# Patient Record
Sex: FEMALE | Race: WHITE | NOT HISPANIC OR LATINO | Employment: OTHER | URBAN - METROPOLITAN AREA
[De-identification: names, ages, dates, MRNs, and addresses within clinical notes are randomized per-mention and may not be internally consistent; named-entity substitution may affect disease eponyms.]

---

## 2017-05-02 ENCOUNTER — OFFICE VISIT (OUTPATIENT)
Dept: OPTOMETRY | Facility: CLINIC | Age: 67
End: 2017-05-02
Payer: COMMERCIAL

## 2017-05-02 DIAGNOSIS — H26.8 PSEUDOEXFOLIATION OF LENS CAPSULE, LEFT EYE: ICD-10-CM

## 2017-05-02 DIAGNOSIS — H26.9 INCIPIENT CATARACT OF BOTH EYES: ICD-10-CM

## 2017-05-02 DIAGNOSIS — H52.4 PRESBYOPIA: Primary | ICD-10-CM

## 2017-05-02 DIAGNOSIS — H52.03 HYPEROPIA, BILATERAL: ICD-10-CM

## 2017-05-02 DIAGNOSIS — H40.002 GLAUCOMA SUSPECT OF LEFT EYE: ICD-10-CM

## 2017-05-02 PROCEDURE — 92004 COMPRE OPH EXAM NEW PT 1/>: CPT | Performed by: OPTOMETRIST

## 2017-05-02 PROCEDURE — 92015 DETERMINE REFRACTIVE STATE: CPT | Performed by: OPTOMETRIST

## 2017-05-02 ASSESSMENT — REFRACTION_MANIFEST
OD_SPHERE: +0.25
OS_AXIS: 085
OS_CYLINDER: +0.50
OD_CYLINDER: SPHERE
OS_SPHERE: +0.25

## 2017-05-02 ASSESSMENT — TONOMETRY
OD_IOP_MMHG: 16
IOP_METHOD: APPLANATION
OS_IOP_MMHG: 16

## 2017-05-02 ASSESSMENT — SLIT LAMP EXAM - LIDS
COMMENTS: MGD
COMMENTS: MGD

## 2017-05-02 ASSESSMENT — CONF VISUAL FIELD
OS_NORMAL: 1
OD_NORMAL: 1

## 2017-05-02 ASSESSMENT — CUP TO DISC RATIO
OD_RATIO: 0.3
OS_RATIO: 0.5

## 2017-05-02 ASSESSMENT — REFRACTION_WEARINGRX
SPECS_TYPE: OTC READERS
OS_SPHERE: +1.50
OD_SPHERE: +1.50

## 2017-05-02 ASSESSMENT — EXTERNAL EXAM - LEFT EYE: OS_EXAM: NORMAL

## 2017-05-02 ASSESSMENT — VISUAL ACUITY
METHOD: SNELLEN - LINEAR
OS_SC+: -2
OS_SC: 20/25
OD_SC+: -2
OD_SC: 20/20

## 2017-05-02 ASSESSMENT — EXTERNAL EXAM - RIGHT EYE: OD_EXAM: NORMAL

## 2017-05-02 NOTE — MR AVS SNAPSHOT
After Visit Summary   5/2/2017    Meggan Jones    MRN: [id number]           Patient Information     Date Of Birth          1950        Visit Information        Provider Department      5/2/2017 2:30 PM Selma Hudson, [facility]        Today's Diagnoses     Presbyopia    -  1    Hyperopia, bilateral        Incipient cataract of both eyes        Pseudoexfoliation of lens capsule, left eye        Glaucoma suspect of left eye          Care Instructions    Use +2.75 readers  +2.00 for arms length / computer      DRY EYE TREATMENT    I recommend using artificial tears for your dry eye. There are over the counter drops that work well and may be used up to 4x daily. ( systane balance, refresh optive, soothe xp) If you need more than 4 drops daily, use a preservative free product which come in individual vials which may be used for 24 hours and discarded.   Artificial tears work best as a preventative and not as well after your eyes are starting to bother you.  It may take 4- 6 weeks of using the drops before you notice improvement.  If after that time you are still having problems schedule an appointment for an evaluation and discussion of different treatments.  Dry eyes are a chronic condition and you may have more symptoms at certain times of the year.      In addition:  Warm compresses once to twice daily for 5-10 minutes    Omega 3 fatty acid supplements 1-2x daily      Call [facility] in [place] for another OCT and visual field test [phone]        Follow-ups after your visit        Additional Services     OPHTHALMOLOGY ADULT REFERRAL       Your provider has referred you to:  N: [facility], [facility]  [phone]  [email or web address]  For a subsequent OCT and visual field    Please be aware that coverage of these services is subject to the terms and limitations of your health insurance plan.  Call member services at your health  plan with any benefit or coverage questions.      Please bring the following to your appointment:  >>   Any x-rays, CTs or MRIs which have been performed.  Contact the facility where they were done to arrange for  prior to your scheduled appointment.  Any new CT, MRI or other procedures ordered by your specialist must be performed at a [facility] facility or coordinated by your clinic's referral office.    >>   List of current medications   >>   This referral request   >>   Any documents/labs given to you for this referral                  Follow-up notes from your care team     Return in about 1 year (around 5/2/2018).      Who to contact     If you have questions or need follow up information about today's clinic visit or your schedule please contact [facility] directly at [phone].  Normal or non-critical lab and imaging results will be communicated to you by MyChart, letter or phone within 4 business days after the clinic has received the results. If you do not hear from us within 7 days, please contact the clinic through [email or web address] or phone. If you have a critical or abnormal lab result, we will notify you by phone as soon as possible.  Submit refill requests through [employer] or call your pharmacy and they will forward the refill request to us. Please allow 3 business days for your refill to be completed.          Additional Information About Your Visit        [employer] Information     [employer] gives you secure access to your electronic health record. If you see a primary care provider, you can also send messages to your care team and make appointments. If you have questions, please call your primary care clinic.  If you do not have a primary care provider, please call [phone] and they will assist you.        Care EveryWhere ID     This is your Care EveryWhere ID. This could be used by other organizations to access your [facility] medical records  [id number]        Your Vitals Were     Last  Period                   01/26/2010            Blood Pressure from Last 3 Encounters:   11/07/16 144/60   10/12/16 140/72   09/23/15 130/62    Weight from Last 3 Encounters:   10/12/16 97.7 kg (215 lb 5 oz)   09/23/15 95.9 kg (211 lb 7 oz)   08/18/15 94.7 kg (208 lb 12.8 oz)              We Performed the Following     EYE EXAM (SIMPLE-NONBILLABLE)     OPHTHALMOLOGY ADULT REFERRAL     REFRACTION        Primary Care Provider Office Phone # Fax #    Adrianna Aparicio [phone]       [facility] [address] [place] [address]        Thank you!     Thank you for choosing [facility]  for your care. Our goal is always to provide you with excellent care. Hearing back from our patients is one way we can continue to improve our services. Please take a few minutes to complete the written survey that you may receive in the mail after your visit with us. Thank you!             Your Updated Medication List - Protect others around you: Learn how to safely use, store and throw away your medicines at www.disposemymeds.org.          This list is accurate as of: 5/2/17  3:17 PM.  Always use your most recent med list.                   Brand Name Dispense Instructions for use    aspirin 81 MG tablet      1 tab po QD (Once per day)       atorvastatin 40 MG tablet    LIPITOR    90 tablet    Take 1 tablet (40 mg) by mouth daily       calcium + D 600-200 MG-UNIT Tabs   Generic drug:  calcium carbonate-vitamin D      1 tab per day       CENTRUM SILVER per tablet      1 TABLET DAILY       FISH OIL CONCENTRATE 1000 MG Caps      1 capsule daily       GLUCOSAMINE CHONDR 500 COMPLEX PO      Take 500 mg by mouth daily       lisinopril-hydrochlorothiazide 20-12.5 MG per tablet    PRINZIDE/ZESTORETIC    60 tablet    Take 2 tablets by mouth daily       vitamin E 400 UNIT capsule      Take 1 capsule by mouth daily.

## 2017-05-02 NOTE — PROGRESS NOTES
Chief Complaint   Patient presents with     COMPREHENSIVE EYE EXAM      Routine, Lyme disease in both eyes 1992,   TBI 2013, glaucoma suspect did not     Last Eye Exam: 1yr [place]   Dilated Previously: Yes    What are you currently using to see?  does not use glasses or contacts for distance only readers        Distance Vision Acuity: Satisfied with vision    Near Vision Acuity: Not satisfied     Eye Comfort: good  Do you use eye drops? : Yes: Systane  Occupation or HobbiesRetired teacher        Medical, surgical and family histories reviewed and updated 5/2/2017.       OBJECTIVE: See Ophthalmology exam    ASSESSMENT:    ICD-10-CM    1. Presbyopia H52.4 REFRACTION     EYE EXAM (SIMPLE-NONBILLABLE)   2. Hyperopia, bilateral H52.03 REFRACTION   3. Incipient cataract of both eyes H26.9    4. Pseudoexfoliation of lens capsule, left eye H26.8         PLAN:   Warm compresses / artificial tears    Continue with readers  Discussed risk for glaucoma   Repeat OCT/ VF at [facility] in  as this was last done in 2013.         Selma Hudson OD

## 2017-05-02 NOTE — PATIENT INSTRUCTIONS
Use +2.75 readers  +2.00 for arms length / computer      DRY EYE TREATMENT    I recommend using artificial tears for your dry eye. There are over the counter drops that work well and may be used up to 4x daily. ( systane balance, refresh optive, soothe xp) If you need more than 4 drops daily, use a preservative free product which come in individual vials which may be used for 24 hours and discarded.   Artificial tears work best as a preventative and not as well after your eyes are starting to bother you.  It may take 4- 6 weeks of using the drops before you notice improvement.  If after that time you are still having problems schedule an appointment for an evaluation and discussion of different treatments.  Dry eyes are a chronic condition and you may have more symptoms at certain times of the year.      In addition:  Warm compresses once to twice daily for 5-10 minutes    Omega 3 fatty acid supplements 1-2x daily      Call [facility] in [place] for another OCT and visual field test [phone]

## 2017-05-10 ENCOUNTER — OFFICE VISIT (OUTPATIENT)
Dept: PODIATRY | Facility: CLINIC | Age: 67
End: 2017-05-10
Payer: COMMERCIAL

## 2017-05-10 ENCOUNTER — RADIANT APPOINTMENT (OUTPATIENT)
Dept: GENERAL RADIOLOGY | Facility: CLINIC | Age: 67
End: 2017-05-10
Attending: PODIATRIST
Payer: COMMERCIAL

## 2017-05-10 VITALS — HEART RATE: 80 BPM | HEIGHT: 62 IN | BODY MASS INDEX: 39.56 KG/M2 | WEIGHT: 215 LBS

## 2017-05-10 DIAGNOSIS — M67.40 GANGLION CYST: ICD-10-CM

## 2017-05-10 DIAGNOSIS — M19.079 ARTHRITIS, MIDFOOT: ICD-10-CM

## 2017-05-10 DIAGNOSIS — L84 CALLUS OF FOOT: ICD-10-CM

## 2017-05-10 DIAGNOSIS — M79.672 LEFT FOOT PAIN: Primary | ICD-10-CM

## 2017-05-10 DIAGNOSIS — M79.672 LEFT FOOT PAIN: ICD-10-CM

## 2017-05-10 PROCEDURE — 99203 OFFICE O/P NEW LOW 30 MIN: CPT | Performed by: PODIATRIST

## 2017-05-10 PROCEDURE — 73630 X-RAY EXAM OF FOOT: CPT | Mod: LT

## 2017-05-10 NOTE — PROGRESS NOTES
ASSESSMENT/PLAN:    Encounter Diagnoses   Name Primary?     Left foot pain Yes     Arthritis, midfoot      Ganglion cyst, dorsal left foot      We reviewed the x-rays together. I showed her the bony spurring on the dorsal foot. This is likely part of the palpable bump, but also might be causing tendon sheath injury and leaking of synovial fluid.     Her pain seems to be more bump related, rather than deep.      We discussed the cause and nature of ganglionic cysts.  I explained that they are benign, but can cause discomfort due to foot wear irritation.  We discussed the options of aspiration and surgical excision.  It was made clear that despite treatment, they have a high rate of recurrance.  I explained that the location is near the artery and nerve.     She is to focus on accommodative shoes and altering her lacing pattern. She will consider aspiration.    Regarding the right foot callus:  Filing, pairing, moisturizing, shoe insert with aperture nor notched all reviewed with her.     Body mass index is 39.64 kg/(m^2).    Weight management plan: Patient was referred to their PCP to discuss a diet and exercise plan.      Quentin Berger DPM, FACFAS, MS    [place] [facility] of Podiatry/Foot & Ankle Surgery      ____________________________________________________________________    HPI:       Chief Complaint: lump on upper left foot  Onset of problem: weeks  Pain/ discomfort is described as:  Occasional pain when walking  Rating:  3/10   The pain is made worse with tight fitting shoes  She questions if it is related to some supplements that she is taking    Secondary concerns:  Painful callus, right foot. Her  helps shave it down. She also uses a pumice stone. She asks if there are other options.    *  Past Medical History:   Diagnosis Date     Allergic rhinitis, cause unspecified      ASCUS favor benign 2004, 2014    neg HPV Plan cotest in 3 yrs     Essential hypertension, benign      Lyme disease     *  *  Past Surgical History:   Procedure Laterality Date     BIOPSY OF BREAST, NEEDLE CORE  2000    Negative-left side 2000     C LIGATE FALLOPIAN TUBE,POSTPARTUM  1981    Tubal Ligation     C NONSPECIFIC PROCEDURE      conization of cervix -long ago? time   *  *  Current Outpatient Prescriptions   Medication Sig Dispense Refill     lisinopril-hydrochlorothiazide (PRINZIDE,ZESTORETIC) 20-12.5 MG per tablet Take 2 tablets by mouth daily 60 tablet 1     Glucosamine-Chondroit-Vit C-Mn (GLUCOSAMINE CHONDR 500 COMPLEX PO) Take 500 mg by mouth daily       vitamin E 400 UNIT capsule Take 1 capsule by mouth daily.       FISH OIL CONCENTRATE OR CAPS 1 capsule daily       ASPIRIN 81 MG OR TABS 1 tab po QD (Once per day)       CALCIUM + D 600-200 MG-UNIT PO TABS Reported on 5/10/2017  3     CENTRUM SILVER OR TABS 1 TABLET DAILY (Patient not taking: Reported on 5/10/2017)         ROS:     A 10-point review of systems was performed and is positive for that noted in the HPI and as seen below.  All other areas are negative.     Numbness in feet?  no   Calf pain with walking? no  Recent foot/ankle injury? no  Weight change over past 12 months? 10# gain  Self perception as overweight? yees  Recent flu-like symptoms? no  Joint pain other than feet ? Shoulders, left thumb    Social History: Employment:  no;  Exercise/Physical activity:  walking;  Tobacco use:  no  Social History     Social History     Marital status:      Spouse name: N/A     Number of children: N/A     Years of education: N/A     Occupational History     Not on file.     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 No     Social History Narrative     since  , going well, went back to work in 2013   , 3 children-3 daughters-40 , 37 and 33 yo, 5 granchildren,  no pets       Family history:  Family History   Problem Relation Age of Onset     C.A.D. Mother      heart valve replacement- from heart failure at of death at 85     CANCER Father      Prostate, from cardiac arrest at age of 81     CANCER Brother      Prostate,heart valve replacemend at ageo f 73-alive      C.A.D. Brother      MI at 71-3 brothers     Family History Negative Brother      one  brother  at age of 69     Family History Negative Sister      6 sisters-one sister with rheumatic fever and related haert murmur, 2 sis iwth htn     Breast Cancer Sister      Breast Cancer Other      niece     Other Cancer Sister      Melanoma     DIABETES No family hx of      Cancer - colorectal No family hx of        Rheumatoid arthritis:  no  Foot Problems: no  Diabetes: no      EXAM:    Vitals: Pulse 80  Ht 5' 1.75\" (1.568 m)  Wt 215 lb (97.5 kg)  LMP 2010  BMI 39.64 kg/m2  BMI: Body mass index is 39.64 kg/(m^2).  Height: 5' 1.75\"    Constitutional/ general:  Pt is in no apparent distress, appears well-nourished.  Cooperative with history and physical exam.     Vascular:  Pedal pulses are palpable bilaterally for both the DP and PT arteries.  CFT < 3 sec.  No edema.  Pedal hair growth noted.     Neuro:  Alert and oriented x 3. Coordinated gait.  Light touch sensation is intact to the L4, L5, S1 distributions. No obvious deficits.  No evidence of neurological-based weakness, spasticity, or contracture in the lower extremities.     Derm: " Normal texture and turgor.  No erythema, ecchymosis, or cyanosis.  No open lesions.     There is an area of soft tissue edema, dorsal left midfoot; 2cm diameter.  Soft, compressible, mobile.  On palpation there is a deeper, harder bump.     Callus sub right 5th met head.     Musculoskeletal:    Lower extremity muscle strength is normal.  Patient is ambulatory without an assistive device or brace .  No gross deformities.      Radiographic Exam:  X-Ray Findings:  I personally reviewed the films.  4 views left foot shoe extensive degenerative joint disease in the tarsometatarsal joints with dorsal spurring. Dorsal soft tissue edema.       Quentin Berger DPM, FACFAS, MS    [place] [facility] of Podiatry/Foot & Ankle Surgery

## 2017-05-10 NOTE — PATIENT INSTRUCTIONS
DR. PRINGLE'S SCHEDULE:        MONDAY / FRIDAY AM - [place] WEDNESDAY - [place]   [address] [place] [address] [place] [address]   P: [phone] P: [phone]   F: [phone] F:[phone]       TUESDAY - SURGERY THURSDAY - [place]   [phone] [address] [place] [address]   TO SCHEDULE AN APPT CALL: P: [phone] F: [phone]     FYI: Our schedule at [facility] on Wednesday is from 7 AM - 2 PM.    GANGLION CYST  A ganglion cyst is a sac filled with a jellylike fluid that originates from a tendon sheath or joint capsule. The word  ganglion  means  knot  and is used to describe the knot-like mass or lump that forms below the surface of the skin.  Ganglion cysts are among the most common benign soft-tissue masses. Although they most often occur on the wrist, they also frequently develop on the foot   usually on the top, but elsewhere as well. Ganglion cysts vary in size, may get smaller and larger, and may even disappear completely, only to return later.  CAUSES  Although the exact cause of ganglion cysts is unknown, they may arise from trauma   whether a single event or repetitive micro-trauma.  SYMPTOMS  A noticeable lump   often this is the only symptom experienced   Tingling or burning, if the cyst is touching a nerve   Dull pain or ache   which may indicate the cyst is pressing against a tendon or joint   Difficulty wearing shoes due to irritation between the lump and the shoe   DIAGNOSIS  To diagnose a ganglion cyst, the foot and ankle surgeon will perform a thorough examination of the foot. The lump will be visually apparent and, when pressed in a certain way, it should move freely underneath the skin. Sometimes the surgeon will shine a light through the cyst or remove a small amount of fluid from the cyst for evaluation. Your doctor may take an x-ray, and in some cases additional imaging studies may be ordered.  NON-SURGICAL  TREATMENT  Monitoring, but no treatment. If the cyst causes no pain and does not interfere with walking, the surgeon may decide it is best to carefully watch the cyst over a period of time.   Shoe modifications. Wearing shoes that do not rub the cyst or cause irritation may be advised. In addition, placing a pad inside the shoe may help reduce pressure against the cyst.   Aspiration and injection. This technique involves draining the fluid and then injecting a steroid medication into the mass. More than one session may be needed. Although this approach is successful in some cases, in many others the cyst returns.   SURGICAL TREATMENT  When other treatment options fail or are not appropriate, the cyst may need to be surgically removed. While the recurrence rate associated with surgery is much lower than that experienced with aspiration and injection therapy, there are nevertheless cases in which the ganglion cyst returns.        BODY MASS INDEX (BMI)  Many things can cause foot and ankle problems. Foot structure, activity level, foot mechanics and injuries are common causes of pain.    One very important issue that often goes unmentioned, is body weight.  Extra weight can cause increased stress on muscles, ligaments, bones and tendons. Sometimes just a few extra pounds is all it takes to put one over her/his threshold. Without reducing that stress, it can be difficult to alleviate pain.      Some people are uncomfortable addressing this issue, but we feel it is important for you to think about it. As Foot & Ankle specialists, our job is addressing the lower extremity problem and possible causes.     Regarding extra body weight, we encourage patients to discuss diet and weight management plans with their primary care doctors. It is this team approach that gives you the best opportunity for pain relief and getting you back on your feet.      PRICE THERAPY    Many aches and pains throughout the foot and ankle can be  helped with many simple treatments. This is usually described as PRICE Therapy.      P - Protection - often times, inflammation/pain in the lower extremity is not able to improve simply because the areas involved are never allowed to rest. Every step we take can bother the problematic area. Protecting those areas is an important step in the healing process. This may involve a walking cast boot, a special insert/orthotic device, an ankle brace, or simply avoiding barefoot walking.    R - Rest - in addition to protecting the foot/ankle, resting is an important, but often times difficult, treatment option. Getting off your feet when they bother you, and specifically avoiding activities that cause pain/discomfort, are very beneficial to prevent, and treat, foot/ankle pain.      I - Ice - icing regularly can help to decrease inflammation and swelling in the foot, thus decreasing pain. Using an ice pack or a bag of frozen veggies works very well. Ice for 20 minutes multiple times per day as needed.  Do not place the ice directly on the skin as this can cause tissue damage.    C - Compression - using a compression wrap or an ACE wrap can help to decrease swelling, which can help to decrease pain. Wearing the wraps is generally not needed at night, but they should be worn on a regular basis when you are going to be on your feet for prolonged periods as gravity tends to pull fluids down to your feet/ankles.    E - Elevation - elevating your lower extremities multiple times daily for 15-20 minutes can help to decrease swelling, which works well in decreasing pain levels.    NSAID/Tylenol - Anti-inflammatories like Aleve or ibuprofen, and/or a pain medication, such as Tylenol, can help to improve pain levels and get the issue resolved sooner rather than later. Anyone with liver issues should be careful with Tylenol, and anyone with high blood pressure or heart, stomach or kidney issues should be careful with  anti-inflammatories. Please ask if you have questions about these medications, including dosage.        CALLUS / CORN / IPK    When there is excessive friction or pressure on the skin, the body responds by making the skin thicker to protect the deeper structures from becoming exposed. While this works well to protect the deeper structures, the thickened skin can cause increased pressure and pain.    Callus: flat, diffuse thickened areas are simple calluses and they are usually caused by friction. Often these are the result of rubbing on a shoe or from going barefoot.    Corns: calluses with a central core on or between the toes are called corns. These result from prominent joints on toes rubbing together. Theses are a symptom of bone malalignment or illfitting shoes and will always recur unless the underlying bones are addressed surgically.    IPK: calluses with a central core on the ball of the foot are usually IPKs (intractable plantar keratosis). These are caused by excessive pressure from the metatarsals, the bones that make up the ball of the foot. Often one of these bones is too long or too prominent. Again, these will always recur unless the underlying bone issue is addressed. There is no cure for these. They will either go away by themselves, recur, or more could develop.    Home Treatment  - File: Trim them down with a pumice stone or callus file a couple times a week to remove callus tissue that builds up. An electric callus removing device. Amope Pedi Perfect Electronic Pedicure Foot File and Callus Remover can be a good option.   - Moisturize: Lotion can be applied to soften the callus. A lactic acid or urea based cream such as Carmol, Kersal or Vanicream or thicker cream with shea butter are good options.   - Foot Gear: Good supportive shoes and minimizing barefoot walking can slow down callus formation and can decrease pain levels. Gel inserts can also provide padding to the bottom of the foot to  prevent pain and slow recurrence. Toe spacers, toe covers, can custom orthotic inserts can be beneficial as well.  - Surgery: If there is a surgical pathology noted, such as a prominent bone, often this needs to be addressed surgically to minimize recurrence. However, sometimes the lesion simply migrates to another spot after surgery, so it is not a guaranteed cure.     If you cannot treat them yourself at home, call the home foot care nurses below:  [facility]  [phone] [facility]   [phone] [facility]   [phone]       OSTEOARTHRITIS OF THE FOOT & ANKLE  Osteoarthritis is a condition characterized by the breakdown and eventual loss of cartilage in one or more joints. Cartilage (the connective tissue found at the end of the bones in the joints) protects and cushions the bones during movement. When cartilage deteriorates or is lost, symptoms develop that can restrict one s ability to easily perform daily activities.  Osteoarthritis is also known as degenerative arthritis, reflecting its nature to develop as part of the aging process. As the most common form of arthritis, osteoarthritis affects millions of Americans. Some people refer to osteoarthritis simply as arthritis, even though there are many different types of arthritis.  Osteoarthritis appears at various joints throughout the body, including the hands, feet, spine, hips, and knees. In the foot, the disease most frequently occurs in the big toe, although it is also often found in the midfoot and ankle.  CAUSES  Osteoarthritis is considered a  wear and tear  disease because the cartilage in the joint wears down with repeated stress and use over time. As the cartilage deteriorates and gets thinner, the bones lose their protective covering and eventually may rub together, causing pain and inflammation of the joint.  An injury may also lead to osteoarthritis, although it may take months or years after the injury for the condition to develop. For  example, osteoarthritis in the big toe is often caused by kicking or jamming the toe, or by dropping something on the toe. Osteoarthritis in the midfoot is often caused by dropping something on it, or by a sprain or fracture. In the ankle, osteoarthritis is usually caused by a fracture and occasionally by a severe sprain.  Sometimes osteoarthritis develops as a result of abnormal foot mechanics such as flat feet or high arches. A flat foot causes less stability in the ligaments (bands of tissue that connect bones), resulting in excessive strain on the joints, which can cause arthritis. A high arch is rigid and lacks mobility, causing a jamming of joints that creates an increased risk of arthritis.  SYMPTOMS  People with osteoarthritis in the foot or ankle experience, in varying degrees, one or more of the following: Pain and stiffness in the joint, swelling in or near the joint, or difficulty walking or bending the joint.   Some patients with osteoarthritis also develop a bone spur (a bony protrusion) at the affected joint. Shoe pressure may cause pain at the site of a bone spur, and in some cases blisters or calluses may form over its surface. Bone spurs can also limit the movement of the joint.    DIAGNOSIS  In diagnosing osteoarthritis, the foot and ankle surgeon will examine the foot thoroughly, looking for swelling in the joint, limited mobility, and pain with movement. In some cases, deformity and/or enlargement (spur) of the joint may be noted. X-rays may be ordered to evaluate the extent of the disease.  NON-SURGICAL TREATMENT  To help relieve symptoms, the surgeon may begin treating osteoarthritis with one or more of the following non-surgical approaches:  Oral medications. Nonsteroidal anti-inflammatory drugs (NSAIDs), such as ibuprofen, are often helpful in reducing the inflammation and pain. Occasionally a prescription for a steroid medication is needed to adequately reduce symptoms.   Orthotic devices.  Custom orthotic devices (shoe inserts) are often prescribed to provide support to improve the foot s mechanics or cushioning to help minimize pain.   Bracing. Bracing, which restricts motion and supports the joint, can reduce pain during walking and help prevent further deformity.   Immobilization. Protecting the foot from movement by wearing a cast or removable cast-boot may be necessary to allow the inflammation to resolve.   Steroid injections. In some cases, steroid injections are applied to the affected joint to deliver anti-inflammatory medication.   Physical therapy. Exercises to strengthen the muscles, especially when the osteoarthritis occurs in the ankle, may give the patient greater stability and help avoid injury that might worsen the condition.   DO I NEED SURGERY?  When osteoarthritis has progressed substantially or failed to improve with non-surgical treatment, surgery may be recommended. In advanced cases, surgery may be the only option. The goal of surgery is to decrease pain and improve function. The foot and ankle surgeon will consider a number of factors when selecting the procedure best suited to the patient s condition and lifestyle.

## 2017-05-10 NOTE — MR AVS SNAPSHOT
After Visit Summary   5/10/2017    Meggan Jones    MRN: [id number]           Patient Information     Date Of Birth          1950        Visit Information        Provider Department      5/10/2017 11:30 AM Quentin Pringle DPM [facility]        Today's Diagnoses     Left foot pain    -  1      Care Instructions    DR. PRINGLE'S SCHEDULE:        MONDAY / FRIDAY AM - [place] WEDNESDAY - [place]   [address] [place] [address] [place] [address]   P: [phone] P: [phone]   F: [phone] F:[phone]       [facility]   [phone] [address] [place] [address]   TO SCHEDULE AN APPT CALL: P: [phone] F: [phone]     FYI: Our schedule at [place] on Wednesday is from 7 AM - 2 PM.    GANGLION CYST  A ganglion cyst is a sac filled with a jellylike fluid that originates from a tendon sheath or joint capsule. The word  ganglion  means  knot  and is used to describe the knot-like mass or lump that forms below the surface of the skin.  Ganglion cysts are among the most common benign soft-tissue masses. Although they most often occur on the wrist, they also frequently develop on the foot - usually on the top, but elsewhere as well. Ganglion cysts vary in size, may get smaller and larger, and may even disappear completely, only to return later.  CAUSES  Although the exact cause of ganglion cysts is unknown, they may arise from trauma - whether a single event or repetitive micro-trauma.  SYMPTOMS  A noticeable lump - often this is the only symptom experienced   Tingling or burning, if the cyst is touching a nerve   Dull pain or ache - which may indicate the cyst is pressing against a tendon or joint   Difficulty wearing shoes due to irritation between the lump and the shoe   DIAGNOSIS  To diagnose a ganglion cyst, the foot and ankle surgeon will perform a thorough examination of the  foot. The lump will be visually apparent and, when pressed in a certain way, it should move freely underneath the skin. Sometimes the surgeon will shine a light through the cyst or remove a small amount of fluid from the cyst for evaluation. Your doctor may take an x-ray, and in some cases additional imaging studies may be ordered.  NON-SURGICAL TREATMENT  Monitoring, but no treatment. If the cyst causes no pain and does not interfere with walking, the surgeon may decide it is best to carefully watch the cyst over a period of time.   Shoe modifications. Wearing shoes that do not rub the cyst or cause irritation may be advised. In addition, placing a pad inside the shoe may help reduce pressure against the cyst.   Aspiration and injection. This technique involves draining the fluid and then injecting a steroid medication into the mass. More than one session may be needed. Although this approach is successful in some cases, in many others the cyst returns.   SURGICAL TREATMENT  When other treatment options fail or are not appropriate, the cyst may need to be surgically removed. While the recurrence rate associated with surgery is much lower than that experienced with aspiration and injection therapy, there are nevertheless cases in which the ganglion cyst returns.        BODY MASS INDEX (BMI)  Many things can cause foot and ankle problems. Foot structure, activity level, foot mechanics and injuries are common causes of pain.    One very important issue that often goes unmentioned, is body weight.  Extra weight can cause increased stress on muscles, ligaments, bones and tendons. Sometimes just a few extra pounds is all it takes to put one over her/his threshold. Without reducing that stress, it can be difficult to alleviate pain.      Some people are uncomfortable addressing this issue, but we feel it is important for you to think about it. As Foot & Ankle specialists, our job is addressing the lower extremity problem  and possible causes.     Regarding extra body weight, we encourage patients to discuss diet and weight management plans with their primary care doctors. It is this team approach that gives you the best opportunity for pain relief and getting you back on your feet.      PRICE THERAPY    Many aches and pains throughout the foot and ankle can be helped with many simple treatments. This is usually described as PRICE Therapy.      P - Protection - often times, inflammation/pain in the lower extremity is not able to improve simply because the areas involved are never allowed to rest. Every step we take can bother the problematic area. Protecting those areas is an important step in the healing process. This may involve a walking cast boot, a special insert/orthotic device, an ankle brace, or simply avoiding barefoot walking.    R - Rest - in addition to protecting the foot/ankle, resting is an important, but often times difficult, treatment option. Getting off your feet when they bother you, and specifically avoiding activities that cause pain/discomfort, are very beneficial to prevent, and treat, foot/ankle pain.      I - Ice - icing regularly can help to decrease inflammation and swelling in the foot, thus decreasing pain. Using an ice pack or a bag of frozen veggies works very well. Ice for 20 minutes multiple times per day as needed.  Do not place the ice directly on the skin as this can cause tissue damage.    C - Compression - using a compression wrap or an ACE wrap can help to decrease swelling, which can help to decrease pain. Wearing the wraps is generally not needed at night, but they should be worn on a regular basis when you are going to be on your feet for prolonged periods as gravity tends to pull fluids down to your feet/ankles.    E - Elevation - elevating your lower extremities multiple times daily for 15-20 minutes can help to decrease swelling, which works well in decreasing pain levels.    NSAID/Tylenol  - Anti-inflammatories like Aleve or ibuprofen, and/or a pain medication, such as Tylenol, can help to improve pain levels and get the issue resolved sooner rather than later. Anyone with liver issues should be careful with Tylenol, and anyone with high blood pressure or heart, stomach or kidney issues should be careful with anti-inflammatories. Please ask if you have questions about these medications, including dosage.        CALLUS / CORN / IPK    When there is excessive friction or pressure on the skin, the body responds by making the skin thicker to protect the deeper structures from becoming exposed. While this works well to protect the deeper structures, the thickened skin can cause increased pressure and pain.    Callus: flat, diffuse thickened areas are simple calluses and they are usually caused by friction. Often these are the result of rubbing on a shoe or from going barefoot.    Corns: calluses with a central core on or between the toes are called corns. These result from prominent joints on toes rubbing together. Theses are a symptom of bone malalignment or illfitting shoes and will always recur unless the underlying bones are addressed surgically.    IPK: calluses with a central core on the ball of the foot are usually IPKs (intractable plantar keratosis). These are caused by excessive pressure from the metatarsals, the bones that make up the ball of the foot. Often one of these bones is too long or too prominent. Again, these will always recur unless the underlying bone issue is addressed. There is no cure for these. They will either go away by themselves, recur, or more could develop.    Home Treatment  - File: Trim them down with a pumice stone or callus file a couple times a week to remove callus tissue that builds up. An electric callus removing device. Amope Pedi Perfect Electronic Pedicure Foot File and Callus Remover can be a good option.   - Moisturize: Lotion can be applied to soften the  callus. A lactic acid or urea based cream such as Carmol, Kersal or Vanicream or thicker cream with shea butter are good options.   - Foot Gear: Good supportive shoes and minimizing barefoot walking can slow down callus formation and can decrease pain levels. Gel inserts can also provide padding to the bottom of the foot to prevent pain and slow recurrence. Toe spacers, toe covers, can custom orthotic inserts can be beneficial as well.  - Surgery: If there is a surgical pathology noted, such as a prominent bone, often this needs to be addressed surgically to minimize recurrence. However, sometimes the lesion simply migrates to another spot after surgery, so it is not a guaranteed cure.     If you cannot treat them yourself at home, call the home foot care nurses below:  [facility]  [phone] [facility]   [phone] [facility]   [phone]       OSTEOARTHRITIS OF THE FOOT & ANKLE  Osteoarthritis is a condition characterized by the breakdown and eventual loss of cartilage in one or more joints. Cartilage (the connective tissue found at the end of the bones in the joints) protects and cushions the bones during movement. When cartilage deteriorates or is lost, symptoms develop that can restrict one s ability to easily perform daily activities.  Osteoarthritis is also known as degenerative arthritis, reflecting its nature to develop as part of the aging process. As the most common form of arthritis, osteoarthritis affects millions of Americans. Some people refer to osteoarthritis simply as arthritis, even though there are many different types of arthritis.  Osteoarthritis appears at various joints throughout the body, including the hands, feet, spine, hips, and knees. In the foot, the disease most frequently occurs in the big toe, although it is also often found in the midfoot and ankle.  CAUSES  Osteoarthritis is considered a  wear and tear  disease because the cartilage in the joint wears down with repeated  stress and use over time. As the cartilage deteriorates and gets thinner, the bones lose their protective covering and eventually may rub together, causing pain and inflammation of the joint.  An injury may also lead to osteoarthritis, although it may take months or years after the injury for the condition to develop. For example, osteoarthritis in the big toe is often caused by kicking or jamming the toe, or by dropping something on the toe. Osteoarthritis in the midfoot is often caused by dropping something on it, or by a sprain or fracture. In the ankle, osteoarthritis is usually caused by a fracture and occasionally by a severe sprain.  Sometimes osteoarthritis develops as a result of abnormal foot mechanics such as flat feet or high arches. A flat foot causes less stability in the ligaments (bands of tissue that connect bones), resulting in excessive strain on the joints, which can cause arthritis. A high arch is rigid and lacks mobility, causing a jamming of joints that creates an increased risk of arthritis.  SYMPTOMS  People with osteoarthritis in the foot or ankle experience, in varying degrees, one or more of the following: Pain and stiffness in the joint, swelling in or near the joint, or difficulty walking or bending the joint.   Some patients with osteoarthritis also develop a bone spur (a bony protrusion) at the affected joint. Shoe pressure may cause pain at the site of a bone spur, and in some cases blisters or calluses may form over its surface. Bone spurs can also limit the movement of the joint.    DIAGNOSIS  In diagnosing osteoarthritis, the foot and ankle surgeon will examine the foot thoroughly, looking for swelling in the joint, limited mobility, and pain with movement. In some cases, deformity and/or enlargement (spur) of the joint may be noted. X-rays may be ordered to evaluate the extent of the disease.  NON-SURGICAL TREATMENT  To help relieve symptoms, the surgeon may begin treating  osteoarthritis with one or more of the following non-surgical approaches:  Oral medications. Nonsteroidal anti-inflammatory drugs (NSAIDs), such as ibuprofen, are often helpful in reducing the inflammation and pain. Occasionally a prescription for a steroid medication is needed to adequately reduce symptoms.   Orthotic devices. Custom orthotic devices (shoe inserts) are often prescribed to provide support to improve the foot s mechanics or cushioning to help minimize pain.   Bracing. Bracing, which restricts motion and supports the joint, can reduce pain during walking and help prevent further deformity.   Immobilization. Protecting the foot from movement by wearing a cast or removable cast-boot may be necessary to allow the inflammation to resolve.   Steroid injections. In some cases, steroid injections are applied to the affected joint to deliver anti-inflammatory medication.   Physical therapy. Exercises to strengthen the muscles, especially when the osteoarthritis occurs in the ankle, may give the patient greater stability and help avoid injury that might worsen the condition.   DO I NEED SURGERY?  When osteoarthritis has progressed substantially or failed to improve with non-surgical treatment, surgery may be recommended. In advanced cases, surgery may be the only option. The goal of surgery is to decrease pain and improve function. The foot and ankle surgeon will consider a number of factors when selecting the procedure best suited to the patient s condition and lifestyle.                  Follow-ups after your visit        Who to contact     If you have questions or need follow up information about today's clinic visit or your schedule please contact [facility] [place] directly at [phone].  Normal or non-critical lab and imaging results will be communicated to you by MyChart, letter or phone within 4 business days after the clinic has received the results. If you do not hear from us within 7 days,  "please contact the clinic through [employer] or phone. If you have a critical or abnormal lab result, we will notify you by phone as soon as possible.  Submit refill requests through [employer] or call your pharmacy and they will forward the refill request to us. Please allow 3 business days for your refill to be completed.          Additional Information About Your Visit        [employer] Information     [employer] gives you secure access to your electronic health record. If you see a primary care provider, you can also send messages to your care team and make appointments. If you have questions, please call your primary care clinic.  If you do not have a primary care provider, please call [phone] and they will assist you.        Care EveryWhere ID     This is your Care EveryWhere ID. This could be used by other organizations to access your [facility] medical records  [id number]        Your Vitals Were     Pulse Height Last Period BMI (Body Mass Index)          80 5' 1.75\" (1.568 m) 01/26/2010 39.64 kg/m2         Blood Pressure from Last 3 Encounters:   11/07/16 144/60   10/12/16 140/72   09/23/15 130/62    Weight from Last 3 Encounters:   05/10/17 215 lb (97.5 kg)   10/12/16 215 lb 5 oz (97.7 kg)   09/23/15 211 lb 7 oz (95.9 kg)               Primary Care Provider Office Phone # Fax #    Adrianna Aparicio [phone]       [address] [place] [address]        Thank you!     Thank you for choosing [facility]  for your care. Our goal is always to provide you with excellent care. Hearing back from our patients is one way we can continue to improve our services. Please take a few minutes to complete the written survey that you may receive in the mail after your visit with us. Thank you!             Your Updated Medication List - Protect others around you: Learn how to safely use, store and throw away your medicines at www.disposemymeds.org.        "   This list is accurate as of: 5/10/17 12:32 PM.  Always use your most recent med list.                   Brand Name Dispense Instructions for use    aspirin 81 MG tablet      1 tab po QD (Once per day)       calcium + D 600-200 MG-UNIT Tabs   Generic drug:  calcium carbonate-vitamin D      Reported on 5/10/2017       CENTRUM SILVER per tablet      1 TABLET DAILY       FISH OIL CONCENTRATE 1000 MG Caps      1 capsule daily       GLUCOSAMINE CHONDR 500 COMPLEX PO      Take 500 mg by mouth daily       lisinopril-hydrochlorothiazide 20-12.5 MG per tablet    PRINZIDE/ZESTORETIC    60 tablet    Take 2 tablets by mouth daily       vitamin E 400 UNIT capsule      Take 1 capsule by mouth daily.

## 2017-05-10 NOTE — LETTER
5/10/2017       RE: Meggan Jones  [address] [place] [id number]           Dear Colleague,    Thank you for referring your patient, Meggan Jones, to the [facility] [place]. Please see a copy of my visit note below.      ASSESSMENT/PLAN:    Encounter Diagnoses   Name Primary?     Left foot pain Yes     Arthritis, midfoot      Ganglion cyst, dorsal left foot      We reviewed the x-rays together. I showed her the bony spurring on the dorsal foot. This is likely part of the palpable bump, but also might be causing tendon sheath injury and leaking of synovial fluid.     Her pain seems to be more bump related, rather than deep.      We discussed the cause and nature of ganglionic cysts.  I explained that they are benign, but can cause discomfort due to foot wear irritation.  We discussed the options of aspiration and surgical excision.  It was made clear that despite treatment, they have a high rate of recurrance.  I explained that the location is near the artery and nerve.     She is to focus on accommodative shoes and altering her lacing pattern. She will consider aspiration.    Regarding the right foot callus:  Filing, pairing, moisturizing, shoe insert with aperture nor notched all reviewed with her.     Body mass index is 39.64 kg/(m^2).    Weight management plan: Patient was referred to their PCP to discuss a diet and exercise plan.      Quentin Berger DPM, FACFAS, MS    [place] Department of Podiatry/Foot & Ankle Surgery      ____________________________________________________________________    HPI:       Chief Complaint: lump on upper left foot  Onset of problem: weeks  Pain/ discomfort is described as:  Occasional pain when walking  Rating:  3/10   The pain is made worse with tight fitting shoes  She questions if it is related to some supplements that she is taking    Secondary concerns:  Painful callus, right foot. Her  helps shave it down. She also uses a pumice stone. She  asks if there are other options.    *  Past Medical History:   Diagnosis Date     Allergic rhinitis, cause unspecified      ASCUS favor benign 2004, 2014    neg HPV Plan cotest in 3 yrs     Essential hypertension, benign      Lyme disease    *  *  Past Surgical History:   Procedure Laterality Date     BIOPSY OF BREAST, NEEDLE CORE  2000    Negative-left side 2000     C LIGATE FALLOPIAN TUBE,POSTPARTUM  1981    Tubal Ligation     C NONSPECIFIC PROCEDURE      conization of cervix -long ago? time   *  *  Current Outpatient Prescriptions   Medication Sig Dispense Refill     lisinopril-hydrochlorothiazide (PRINZIDE,ZESTORETIC) 20-12.5 MG per tablet Take 2 tablets by mouth daily 60 tablet 1     Glucosamine-Chondroit-Vit C-Mn (GLUCOSAMINE CHONDR 500 COMPLEX PO) Take 500 mg by mouth daily       vitamin E 400 UNIT capsule Take 1 capsule by mouth daily.       FISH OIL CONCENTRATE OR CAPS 1 capsule daily       ASPIRIN 81 MG OR TABS 1 tab po QD (Once per day)       CALCIUM + D 600-200 MG-UNIT PO TABS Reported on 5/10/2017  3     CENTRUM SILVER OR TABS 1 TABLET DAILY (Patient not taking: Reported on 5/10/2017)         ROS:     A 10-point review of systems was performed and is positive for that noted in the HPI and as seen below.  All other areas are negative.     Numbness in feet?  no   Calf pain with walking? no  Recent foot/ankle injury? no  Weight change over past 12 months? 10# gain  Self perception as overweight? yees  Recent flu-like symptoms? no  Joint pain other than feet ? Shoulders, left thumb    Social History: Employment:  no;  Exercise/Physical activity:  walking;  Tobacco use:  no  Social History     Social History     Marital status:      Spouse name: N/A     Number of children: N/A     Years of education: N/A     Occupational History     Not on file.     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 No     Social History Narrative     since  , going well, went back to work in    , 3 children-3 daughters-40 , 37 and 35 yo, 5 granchildren,  no pets       Family history:  Family History   Problem Relation Age of Onset     C.A.D. Mother      heart valve replacement- from heart failure at of death at 85     CANCER Father      Prostate, from cardiac arrest at age of 81     CANCER Brother      Prostate,heart valve replacemend at ageo f 73-alive      C.A.D. Brother      MI at 71-3 brothers     Family History Negative Brother      one  brother  at age of 69     Family History Negative Sister      6 sisters-one sister with rheumatic fever and related haert murmur, 2 sis iwth htn     Breast Cancer Sister      Breast Cancer Other      niece     Other Cancer Sister      Melanoma     DIABETES No family hx of      Cancer - colorectal No family hx of        Rheumatoid arthritis:  no  Foot Problems: no  Diabetes: no      EXAM:    Vitals: Pulse 80  Ht 5' 1.75\" (1.568 m)  Wt 215 lb (97.5 kg)  LMP 2010  BMI 39.64 kg/m2  BMI: Body mass index is 39.64 kg/(m^2).  Height: 5' 1.75\"    Constitutional/ general:  Pt is in no apparent distress, appears well-nourished.  Cooperative with history and physical exam.     Vascular:  Pedal pulses are palpable bilaterally for both the DP and PT arteries.  CFT < 3 sec.  No edema.  Pedal hair growth noted.     Neuro:  " Alert and oriented x 3. Coordinated gait.  Light touch sensation is intact to the L4, L5, S1 distributions. No obvious deficits.  No evidence of neurological-based weakness, spasticity, or contracture in the lower extremities.     Derm: Normal texture and turgor.  No erythema, ecchymosis, or cyanosis.  No open lesions.     There is an area of soft tissue edema, dorsal left midfoot; 2cm diameter.  Soft, compressible, mobile.  On palpation there is a deeper, harder bump.     Callus sub right 5th met head.     Musculoskeletal:    Lower extremity muscle strength is normal.  Patient is ambulatory without an assistive device or brace .  No gross deformities.      Radiographic Exam:  X-Ray Findings:  I personally reviewed the films.  4 views left foot shoe extensive degenerative joint disease in the tarsometatarsal joints with dorsal spurring. Dorsal soft tissue edema.       Quentin Berger DPM, FACFAS, MS    [place] Department of Podiatry/Foot & Ankle Surgery                Again, thank you for allowing me to participate in the care of your patient.        Sincerely,              Quentin Berger DPM

## 2017-05-10 NOTE — NURSING NOTE
"Chief Complaint   Patient presents with     Foot Problems     Lump on top of left foot that has been getting bigger, not very painful though, callus question on right foot       Initial Pulse 80  Ht 5' 1.75\" (1.568 m)  Wt 215 lb (97.5 kg)  LMP 01/26/2010  BMI 39.64 kg/m2 Estimated body mass index is 39.64 kg/(m^2) as calculated from the following:    Height as of this encounter: 5' 1.75\" (1.568 m).    Weight as of this encounter: 215 lb (97.5 kg).  Medication Reconciliation: complete  "

## 2017-08-19 ENCOUNTER — HEALTH MAINTENANCE LETTER (OUTPATIENT)
Age: 67
End: 2017-08-19

## 2017-09-27 DIAGNOSIS — I10 ESSENTIAL HYPERTENSION, BENIGN: Primary | ICD-10-CM

## 2017-09-27 RX ORDER — LISINOPRIL AND HYDROCHLOROTHIAZIDE 20; 25 MG/1; MG/1
TABLET ORAL
Qty: 90 TABLET | Refills: 0 | Status: SHIPPED | OUTPATIENT
Start: 2017-09-27 | End: 2017-11-01

## 2017-09-27 NOTE — TELEPHONE ENCOUNTER
lisinopril-hydrochlorothiazide (PRINZIDE,ZESTORETIC) 20-12.5 MG per tablet      Last Written Prescription Date: 11/8/2016  Last Fill Quantity: 60, # refills: 1  Last Office Visit with [facility], [facility] or [facility] prescribing provider: 10/12/2016       Potassium   Date Value Ref Range Status   10/12/2016 3.9 3.4 - 5.3 mmol/L Final     Creatinine   Date Value Ref Range Status   10/12/2016 0.82 0.52 - 1.04 mg/dL Final     BP Readings from Last 3 Encounters:   11/07/16 144/60   10/12/16 140/72   09/23/15 130/62

## 2017-09-27 NOTE — LETTER
[address]  [address]  [facility] [address]  Phone: [phone]  Fax: [phone]    09/27/17    Meggan Jones  [address] [place] [id number]      To whom it may concern:     We recently received a call from your pharmacy requesting a refill of your medication.    A review of your chart indicates that an appointment is required with your provider for an Annual Physical with Labs. Your last physical with October 2016. Please call the clinic at [phone] to schedule your appointment.    We have authorized one refill of your medication to allow time for you to schedule your appointment.    Taking care of your health is important to us, and ongoing visits with your provider are vital to your care.  We look forward to seeing you in the near future.        Sincerely,      Adrianna Aparicio MD/ Care Team

## 2017-09-27 NOTE — TELEPHONE ENCOUNTER
Medication is being filled for 1 time refill only due to:  Patient needs to be seen because due for annual physical with labs.     Letter sent.     Prescription approved per [facility] Refill Protocol.    Jael MEMBRENO RN, BSN, PHN  [place] [facility] RN

## 2017-10-27 ENCOUNTER — OFFICE VISIT - HEALTHEAST (OUTPATIENT)
Dept: CARDIOLOGY | Facility: CLINIC | Age: 67
End: 2017-10-27

## 2017-10-27 DIAGNOSIS — R07.9 CHEST PAIN, UNSPECIFIED TYPE: ICD-10-CM

## 2017-10-27 LAB
ATRIAL RATE - MUSE: 78 BPM
DIASTOLIC BLOOD PRESSURE - MUSE: NORMAL MMHG
INTERPRETATION ECG - MUSE: NORMAL
P AXIS - MUSE: 52 DEGREES
PR INTERVAL - MUSE: 156 MS
QRS DURATION - MUSE: 82 MS
QT - MUSE: 402 MS
QTC - MUSE: 458 MS
R AXIS - MUSE: 37 DEGREES
SYSTOLIC BLOOD PRESSURE - MUSE: NORMAL MMHG
T AXIS - MUSE: 68 DEGREES
VENTRICULAR RATE- MUSE: 78 BPM

## 2017-10-27 ASSESSMENT — MIFFLIN-ST. JEOR: SCORE: 1453.47

## 2017-10-28 DIAGNOSIS — E78.5 HYPERLIPIDEMIA LDL GOAL <160: ICD-10-CM

## 2017-10-28 LAB
ALBUMIN SERPL-MCNC: 3.7 G/DL (ref 3.4–5)
ALP SERPL-CCNC: 81 U/L (ref 40–150)
ALT SERPL W P-5'-P-CCNC: 37 U/L (ref 0–50)
ANION GAP SERPL CALCULATED.3IONS-SCNC: 7 MMOL/L (ref 3–14)
AST SERPL W P-5'-P-CCNC: 18 U/L (ref 0–45)
BILIRUB SERPL-MCNC: 0.4 MG/DL (ref 0.2–1.3)
BUN SERPL-MCNC: 17 MG/DL (ref 7–30)
CALCIUM SERPL-MCNC: 9.5 MG/DL (ref 8.5–10.1)
CHLORIDE SERPL-SCNC: 101 MMOL/L (ref 94–109)
CHOLEST SERPL-MCNC: 113 MG/DL
CO2 SERPL-SCNC: 30 MMOL/L (ref 20–32)
CREAT SERPL-MCNC: 0.85 MG/DL (ref 0.52–1.04)
GFR SERPL CREATININE-BSD FRML MDRD: 66 ML/MIN/1.7M2
GLUCOSE SERPL-MCNC: 87 MG/DL (ref 70–99)
HDLC SERPL-MCNC: 35 MG/DL
LDLC SERPL CALC-MCNC: 55 MG/DL
NONHDLC SERPL-MCNC: 78 MG/DL
POTASSIUM SERPL-SCNC: 4 MMOL/L (ref 3.4–5.3)
PROT SERPL-MCNC: 8.2 G/DL (ref 6.8–8.8)
SODIUM SERPL-SCNC: 138 MMOL/L (ref 133–144)
TRIGL SERPL-MCNC: 115 MG/DL

## 2017-10-28 PROCEDURE — 80053 COMPREHEN METABOLIC PANEL: CPT | Performed by: INTERNAL MEDICINE

## 2017-10-28 PROCEDURE — 36415 COLL VENOUS BLD VENIPUNCTURE: CPT | Performed by: INTERNAL MEDICINE

## 2017-10-28 PROCEDURE — 80061 LIPID PANEL: CPT | Performed by: INTERNAL MEDICINE

## 2017-11-01 ENCOUNTER — HOSPITAL ENCOUNTER (OUTPATIENT)
Dept: CARDIOLOGY | Facility: CLINIC | Age: 67
Discharge: HOME | End: 2017-11-01
Attending: INTERNAL MEDICINE

## 2017-11-01 ENCOUNTER — OFFICE VISIT (OUTPATIENT)
Dept: PEDIATRICS | Facility: CLINIC | Age: 67
End: 2017-11-01
Payer: COMMERCIAL

## 2017-11-01 VITALS
HEART RATE: 86 BPM | TEMPERATURE: 96.1 F | HEIGHT: 62 IN | DIASTOLIC BLOOD PRESSURE: 62 MMHG | OXYGEN SATURATION: 96 % | SYSTOLIC BLOOD PRESSURE: 142 MMHG | WEIGHT: 216.1 LBS | BODY MASS INDEX: 39.77 KG/M2

## 2017-11-01 DIAGNOSIS — I10 ESSENTIAL HYPERTENSION, BENIGN: ICD-10-CM

## 2017-11-01 DIAGNOSIS — R07.9 CHEST PAIN, UNSPECIFIED TYPE: ICD-10-CM

## 2017-11-01 DIAGNOSIS — Z01.419 ENCOUNTER FOR GYNECOLOGICAL EXAMINATION WITHOUT ABNORMAL FINDING: Primary | ICD-10-CM

## 2017-11-01 DIAGNOSIS — E66.01 MORBID OBESITY (H): ICD-10-CM

## 2017-11-01 DIAGNOSIS — Z23 NEED FOR PROPHYLACTIC VACCINATION WITH TETANUS-DIPHTHERIA (TD): ICD-10-CM

## 2017-11-01 DIAGNOSIS — Z12.31 ENCOUNTER FOR SCREENING MAMMOGRAM FOR BREAST CANCER: ICD-10-CM

## 2017-11-01 DIAGNOSIS — R87.610 ATYPICAL SQUAMOUS CELLS OF UNDETERMINED SIGNIFICANCE ON CYTOLOGIC SMEAR OF CERVIX (ASC-US): ICD-10-CM

## 2017-11-01 DIAGNOSIS — Z91.81 AT RISK FOR FALLING: ICD-10-CM

## 2017-11-01 DIAGNOSIS — E78.5 HYPERLIPIDEMIA LDL GOAL <160: ICD-10-CM

## 2017-11-01 LAB
CREAT UR-MCNC: 51 MG/DL
CV STRESS CURRENT BP HE: NORMAL
CV STRESS CURRENT HR HE: 101
CV STRESS CURRENT HR HE: 102
CV STRESS CURRENT HR HE: 109
CV STRESS CURRENT HR HE: 111
CV STRESS CURRENT HR HE: 113
CV STRESS CURRENT HR HE: 114
CV STRESS CURRENT HR HE: 115
CV STRESS CURRENT HR HE: 125
CV STRESS CURRENT HR HE: 137
CV STRESS CURRENT HR HE: 137
CV STRESS CURRENT HR HE: 141
CV STRESS CURRENT HR HE: 141
CV STRESS CURRENT HR HE: 149
CV STRESS CURRENT HR HE: 150
CV STRESS CURRENT HR HE: 150
CV STRESS CURRENT HR HE: 91
CV STRESS CURRENT HR HE: 94
CV STRESS CURRENT HR HE: 95
CV STRESS CURRENT HR HE: 96
CV STRESS CURRENT HR HE: 97
CV STRESS CURRENT HR HE: 98
CV STRESS CURRENT HR HE: 99
CV STRESS DEVIATION TIME HE: NORMAL
CV STRESS ECHO PERCENT HR HE: NORMAL
CV STRESS EXERCISE STAGE HE: NORMAL
CV STRESS FINAL RESTING BP HE: NORMAL
CV STRESS FINAL RESTING HR HE: 96
CV STRESS MAX HR HE: 150
CV STRESS MAX TREADMILL GRADE HE: 10
CV STRESS MAX TREADMILL SPEED HE: 1.7
CV STRESS PEAK DIA BP HE: NORMAL
CV STRESS PEAK SYS BP HE: NORMAL
CV STRESS PHASE HE: NORMAL
CV STRESS PROTOCOL HE: NORMAL
CV STRESS RESTING PT POSITION HE: NORMAL
CV STRESS RESTING PT POSITION HE: NORMAL
CV STRESS ST DEVIATION AMOUNT HE: NORMAL
CV STRESS ST DEVIATION ELEVATION HE: NORMAL
CV STRESS ST EVELATION AMOUNT HE: NORMAL
CV STRESS TEST TYPE HE: NORMAL
CV STRESS TOTAL STAGE TIME MIN 1 HE: NORMAL
MICROALBUMIN UR-MCNC: 6 MG/L
MICROALBUMIN/CREAT UR: 12.47 MG/G CR (ref 0–25)
STRESS ECHO BASELINE BP: NORMAL
STRESS ECHO BASELINE HR: 92
STRESS ECHO CALCULATED PERCENT HR: 98 %
STRESS ECHO LAST STRESS BP: NORMAL
STRESS ECHO LAST STRESS HR: 149
STRESS ECHO POST ESTIMATED WORKLOAD: 4.4
STRESS ECHO POST EXERCISE DUR MIN: 2
STRESS ECHO POST EXERCISE DUR SEC: 54
STRESS ECHO TARGET HR: 130

## 2017-11-01 PROCEDURE — 82043 UR ALBUMIN QUANTITATIVE: CPT | Performed by: INTERNAL MEDICINE

## 2017-11-01 PROCEDURE — 88142 CYTOPATH C/V THIN LAYER: CPT | Performed by: INTERNAL MEDICINE

## 2017-11-01 PROCEDURE — 99214 OFFICE O/P EST MOD 30 MIN: CPT | Mod: 25 | Performed by: INTERNAL MEDICINE

## 2017-11-01 PROCEDURE — 87624 HPV HI-RISK TYP POOLED RSLT: CPT | Performed by: INTERNAL MEDICINE

## 2017-11-01 PROCEDURE — 99397 PER PM REEVAL EST PAT 65+ YR: CPT | Performed by: INTERNAL MEDICINE

## 2017-11-01 RX ORDER — ATORVASTATIN CALCIUM 20 MG/1
20 TABLET, FILM COATED ORAL DAILY
COMMUNITY
End: 2017-11-01

## 2017-11-01 RX ORDER — ATORVASTATIN CALCIUM 20 MG/1
20 TABLET, FILM COATED ORAL DAILY
Qty: 90 TABLET | Refills: 3 | Status: SHIPPED | OUTPATIENT
Start: 2017-11-01 | End: 2018-11-12

## 2017-11-01 RX ORDER — LISINOPRIL AND HYDROCHLOROTHIAZIDE 20; 25 MG/1; MG/1
1 TABLET ORAL DAILY
Qty: 90 TABLET | Refills: 3 | Status: SHIPPED | OUTPATIENT
Start: 2017-11-01 | End: 2018-11-12

## 2017-11-01 NOTE — NURSING NOTE
"Chief Complaint   Patient presents with     Physical       Initial /62 (BP Location: Right arm, Patient Position: Sitting, Cuff Size: Adult Large)  Pulse 86  Temp 96.1  F (35.6  C) (Tympanic)  Ht 5' 1.75\" (1.568 m)  Wt 216 lb 1.6 oz (98 kg)  LMP 01/26/2010  SpO2 96%  BMI 39.85 kg/m2 Estimated body mass index is 39.85 kg/(m^2) as calculated from the following:    Height as of this encounter: 5' 1.75\" (1.568 m).    Weight as of this encounter: 216 lb 1.6 oz (98 kg).  Medication Reconciliation: complete     Mouna Rice      "

## 2017-11-01 NOTE — PATIENT INSTRUCTIONS
Exercise regularly for 30-40 minutes 5-6 days a week.    Eat a diet high in healthy fats (avocado, fish etc.) like a mediterranean diet.     Stop by the pharmacy and check in about tetanus and flu shot.     Urine test on your way out.     Preventive Health Recommendations    Female Ages 65 +    Yearly exam:     See your health care provider every year in order to  o Review health changes.   o Discuss preventive care.    o Review your medicines if your doctor has prescribed any.      You no longer need a yearly Pap test unless you've had an abnormal Pap test in the past 10 years. If you have vaginal symptoms, such as bleeding or discharge, be sure to talk with your provider about a Pap test.      Every 1 to 2 years, have a mammogram.  If you are over 69, talk with your health care provider about whether or not you want to continue having screening mammograms.      Every 10 years, have a colonoscopy. Or, have a yearly FIT test (stool test). These exams will check for colon cancer.       Have a cholesterol test every 5 years, or more often if your doctor advises it.       Have a diabetes test (fasting glucose) every three years. If you are at risk for diabetes, you should have this test more often.       At age 65, have a bone density scan (DEXA) to check for osteoporosis (brittle bone disease).    Shots:    Get a flu shot each year.    Get a tetanus shot every 10 years.    Talk to your doctor about your pneumonia vaccines. There are now two you should receive - Pneumovax (PPSV 23) and Prevnar (PCV 13).    Talk to your doctor about the shingles vaccine.    Talk to your doctor about the hepatitis B vaccine.    Nutrition:     Eat at least 5 servings of fruits and vegetables each day.      Eat whole-grain bread, whole-wheat pasta and brown rice instead of white grains and rice.      Talk to your provider about Calcium and Vitamin D.     Lifestyle    Exercise at least 150 minutes a week (30 minutes a day, 5 days a week).  This will help you control your weight and prevent disease.      Limit alcohol to one drink per day.      No smoking.       Wear sunscreen to prevent skin cancer.       See your dentist twice a year for an exam and cleaning.      See your eye doctor every 1 to 2 years to screen for conditions such as glaucoma, macular degeneration and cataracts.

## 2017-11-01 NOTE — PROGRESS NOTES
"  SUBJECTIVE:   Meggan Jones is a 67 year old female who presents for Preventive Visit.    Meggan is a patient with a complex PMHx who presents to the clinic for her annual physical. Since her last visit patient reports 2 weeks ago she could not sleep; heard her heart pounding in her chest with chest pain. She was running a fever of 102.5; EKG, CT and XR of lungs and CBC revealed no pathology. She was diagnosed with a virus and to follow up with cardiology. Patient followed up with cardiology who preformed an EKG which was normal. She is scheduled for a stress test today.     Reports she developed a ganglion cyst on the top of her left foot. She has switched some of her shoes per podiatry request; swelling has decreased since. Since she experienced pain and swelling she has not been walking like normal. She would like to get back to walking, bicycling and swimming.     She states her arthritis in her shoulders has been painful. Patient does not \"trust herself with as much weight\" when lifting things since the pain onset; notes it would be good to add in strength training.     Patient states she is only taking 1/2 her Lipitor 40 for the last 2 months due to muscle aches in legs and shoulders. On 10/28 cholesterol panel showed cholesterol 113 (HDL: 35; LDL: 55).     She reports she is taking 1 pill lisinopril-hydrochlorothiazide 20/25 qd. BP at home were max systolic: 134;  notes usually systolic ranges between 124-131.     Patient is electing to have PAP taken today due to abnormal PAP smear in 2014. Of note, patient has a FHx of breast cancer; is electing to continue mammograms Q1 year.     Are you in the first 12 months of your Medicare Part B coverage?  No    Healthy Habits:  Answers for HPI/ROS submitted by the patient on 11/1/2017   Annual Exam:  Getting at least 3 servings of Calcium per day:: Yes  Bi-annual eye exam:: Yes  Dental care twice a year:: Yes  Sleep apnea or symptoms of sleep apnea:: None  Diet:: " Regular (no restrictions)  Taking medications regularly:: Yes  Medication side effects:: Muscle aches  Additional concerns today:: No  PHQ-2 Score: 0        COGNITIVE SCREEN  1) Repeat 3 items (Banana, Sunrise, Chair)    2) Clock draw: NORMAL  3) 3 item recall: Recalls 3 objects  Results: 3 items recalled: COGNITIVE IMPAIRMENT LESS LIKELY    Mini-CogTM Copyright ROSSY Hays. Licensed by the author for use in [facility]; reprinted with permission (julito@[facility]). All rights reserved.          Reviewed and updated as needed this visit by clinical staff  Tobacco  Allergies  Meds  Med Hx  Surg Hx  Fam Hx  Soc Hx        Reviewed and updated as needed this visit by Provider        Social History   Substance Use Topics     Smoking status: Never Smoker     Smokeless tobacco: Never Used     Alcohol use Yes      Comment: rarely       The patient does not drink >3 drinks per day nor >7 drinks per week.    Today's PHQ-2 Score:   PHQ-2 ( 1999 Pfizer) 11/1/2017 10/12/2016   Q1: Little interest or pleasure in doing things 0 0   Q2: Feeling down, depressed or hopeless 0 0   PHQ-2 Score 0 0   Q1: Little interest or pleasure in doing things Not at all -   Q2: Feeling down, depressed or hopeless Not at all -   PHQ-2 Score 0 -         Do you feel safe in your environment - Yes    Do you have a Health Care Directive?: No: Advance care planning reviewed with patient; information given to patient to review.      Current providers sharing in care for this patient include: Patient Care Team:  Adrianna Aparicio MD as PCP - General (Pediatrics)      Hearing impairment: No    Ability to successfully perform activities of daily living: Yes, no assistance needed     Fall risk:  Fallen 2 or more times in the past year?: No  Any fall with injury in the past year?: No      Home safety:  none identified      The following health maintenance items are reviewed in Epic and correct as of today:  Health Maintenance   Topic Date  Due     MICROALBUMIN Q1 YEAR  2016     PAP Q3 YR  2017     INFLUENZA VACCINE (SYSTEM ASSIGNED)  2017     FALL RISK ASSESSMENT  10/12/2017     TETANUS Q10 YR  2017     MAMMO SCREEN Q2 YR (SYSTEM ASSIGNED)  10/26/2018     BMP Q1 YR  10/28/2018     COLONOSCOPY Q5 YR  2020     ADVANCE DIRECTIVE PLANNING Q5 YRS  2020     DEXA Q5 YR  10/15/2020     LIPID MONITORING Q5 YEARS  10/28/2022     PNEUMOCOCCAL  Completed     HEPATITIS C SCREENING  Completed     BP Readings from Last 3 Encounters:   17 142/62   16 144/60   10/12/16 140/72    Wt Readings from Last 3 Encounters:   17 216 lb 1.6 oz (98 kg)   05/10/17 215 lb (97.5 kg)   10/12/16 215 lb 5 oz (97.7 kg)                  Patient Active Problem List   Diagnosis     COMMUNIC DIS CONTACT WC  DOI 12/3/01     Allergic rhinitis     Obesity     Essential hypertension, benign     Dry eyes     CARDIOVASCULAR SCREENING; LDL GOAL LESS THAN 130     Osteopenia     Hypertriglyceridemia     Advanced directives, counseling/discussion     Family history of hypothyroidism     Menopausal state     Hyperlipidemia LDL goal <160     Past Surgical History:   Procedure Laterality Date     BIOPSY OF BREAST, NEEDLE CORE      Negative-left side      C LIGATE FALLOPIAN TUBE,POSTPARTUM      Tubal Ligation     C NONSPECIFIC PROCEDURE      conization of cervix -long ago? time       Social History   Substance Use Topics     Smoking status: Never Smoker     Smokeless tobacco: Never Used     Alcohol use Yes      Comment: rarely     Family History   Problem Relation Age of Onset     C.A.D. Mother      heart valve replacement- from heart failure at of death at 85     CANCER Father      Prostate, from cardiac arrest at age of 81     CANCER Brother      Prostate,heart valve replacemend at ageo f 73-alive      C.A.D. Brother      MI at 71-3 brothers     Family History Negative Brother      one  brother  at age of 69     Family  History Negative Sister      6 sisters-one sister with rheumatic fever and related haert murmur, 2 sis iwth htn     Breast Cancer Sister      Breast Cancer Other      niece     Other Cancer Sister      Melanoma     DIABETES No family hx of      Cancer - colorectal No family hx of          Current Outpatient Prescriptions   Medication Sig Dispense Refill     atorvastatin (LIPITOR) 20 MG tablet Take 20 mg by mouth daily       lisinopril-hydrochlorothiazide (PRINZIDE/ZESTORETIC) 20-25 MG per tablet TAKE 1 TABLET EVERY MORNING 90 tablet 0     vitamin E 400 UNIT capsule Take 1 capsule by mouth daily.       CALCIUM + D 600-200 MG-UNIT PO TABS Reported on 5/10/2017  3     FISH OIL CONCENTRATE OR CAPS 1 capsule daily       CENTRUM SILVER OR TABS 1 TABLET DAILY       ASPIRIN 81 MG OR TABS 1 tab po QD (Once per day)       [DISCONTINUED] lisinopril-hydrochlorothiazide (PRINZIDE,ZESTORETIC) 20-12.5 MG per tablet Take 2 tablets by mouth daily 60 tablet 1     Glucosamine-Chondroit-Vit C-Mn (GLUCOSAMINE CHONDR 500 COMPLEX PO) Take 500 mg by mouth daily       Allergies   Allergen Reactions     Dust Mite Extract      Flu Virus Vaccine      Bad reaction     Iodine      Mold      Pollen [Pollen Extract]      Shellfish Allergy            Pneumonia Vaccine: UTD  Mammogram Screening: Patient over age 50, mutual decision to screen reflected in health maintenance.    ROS:  Constitutional, HEENT, cardiovascular, pulmonary, GI, , musculoskeletal, neuro, skin, endocrine and psych systems are negative, except as otherwise noted.    FOOT POSITIVE for pain, edema   MS POSITIVE for arthritis in shoulder        This document serves as a record of the services and decisions personally performed and made by Adrianna Aparicio MD. It was created on her behalf by Brooklyn Graves, a trained medical scribe. The creation of this document is based the provider's statements to the medical scribe.    Brooklyn Graves November 1, 2017 9:42 AM  OBJECTIVE:   BP  "142/62 (BP Location: Right arm, Patient Position: Sitting, Cuff Size: Adult Large)  Pulse 86  Temp 96.1  F (35.6  C) (Tympanic)  Ht 5' 1.75\" (1.568 m)  Wt 216 lb 1.6 oz (98 kg)  LMP 01/26/2010  SpO2 96%  BMI 39.85 kg/m2 Estimated body mass index is 39.85 kg/(m^2) as calculated from the following:    Height as of this encounter: 5' 1.75\" (1.568 m).    Weight as of this encounter: 216 lb 1.6 oz (98 kg).  EXAM:   GENERAL APPEARANCE: healthy, alert and no distress  EYES: Eyes grossly normal to inspection, PERRL and conjunctivae and sclerae normal  HENT: ear canals and TM's normal, nose and mouth without ulcers or lesions, oropharynx clear and oral mucous membranes moist  NECK: no adenopathy, no asymmetry, masses, or scars and thyroid normal to palpation  RESP: lungs clear to auscultation - no rales, rhonchi or wheezes  BREAST: normal without masses, tenderness or nipple discharge and no palpable axillary masses or adenopathy  : Vagina and vulva are normal;  no discharge is noted.  Cervix normal without lesions. Uterus anteverted and mobile, normal in size and shape without tenderness.  CV: regular rate and rhythm, normal S1 S2, no S3 or S4, no murmur, click or rub, no peripheral edema and peripheral pulses strong  ABDOMEN: soft, nontender, no hepatosplenomegaly, no masses and bowel sounds normal  MS: no musculoskeletal defects are noted and gait is age appropriate without ataxia  SKIN: no suspicious lesions or rashes  NEURO: Normal strength and tone, sensory exam grossly normal, mentation intact and speech normal  PSYCH: mentation appears normal and affect normal/bright    ASSESSMENT / PLAN:   (Z01.419) Encounter for gynecological examination without abnormal finding  (primary encounter diagnosis)  -- PAP taken today   --  tetanus patient is checking with insurance; pt refuses flu vaccine   -- mammo UTD; patient is electing mammograms Q1 year due to family history of breast ca  -- DEXA UTD   -- colon UTD "   Plan: Pap imaged thin layer screen with HPV -         recommended age 30 - 65 years (select HPV order        below), HPV High Risk Types DNA Cervical          (I10) Essential hypertension, benign  -- BP in clinic was 142/62; suspect this is high because patient is in clinic; repeat by me still high   -- will check microalbumin; will adjust medication according to labs   -recheck bp in 1 month with nurse visit, if still high will increase to 40mg lisinopril   -- continue on current medication for now   Plan: lisinopril-hydrochlorothiazide         (PRINZIDE/ZESTORETIC) 20-25 MG per tablet,         Albumin Random Urine Quantitative with Creat         Ratio      (E78.5) Hyperlipidemia LDL goal <160  -- patient has been taking 1/2 pill (20 MG) Lipitor 40 MG due to muscle aches   -- cholesterol panel was normal ; will continue with 20 MG Lipitor   Plan: atorvastatin (LIPITOR) 20 MG tablet           (E66.01) Morbid obesity (H)  -- discussed regular exercise and balanced diet   -encouraged regular exercise     (R87.610) Atypical squamous cells of undetermined significance on cytologic smear of cervix (ASC-US)  -- discussed taking PAP as a preventative measure due to abnormal pap in past; patient agrees  -if normal then no further pap      (Z12.31) Encounter for screening mammogram for breast cancer  -- patient has a FHx (2 sisters) of breast cancer and is electing to continue mammograms Q1 year   Plan: *MA Screening Digital Bilateral       (Z91.81) At risk for falling  -- continue current care plan       (Z23) Need for prophylactic vaccination with tetanus-diphtheria (TD)  -- patient checking with insurance for coverage criteria       Follow up in 4 weeks for nurse bp check, otherwise annually     End of Life Planning:  Patient currently has an advanced directive: No.  I have verified the patient's ablity to prepare an advanced directive/make health care decisions.  Literature was provided to assist patient in preparing  "an advanced directive.    COUNSELING:  Reviewed preventive health counseling, as reflected in patient instructions       Regular exercise       Healthy diet/nutrition       Immunizations    Declined: Influenza due to Concerns about side effects/safety             Osteoporosis Prevention/Bone Health          Estimated body mass index is 39.85 kg/(m^2) as calculated from the following:    Height as of this encounter: 5' 1.75\" (1.568 m).    Weight as of this encounter: 216 lb 1.6 oz (98 kg).  Weight management plan: Discussed healthy diet and exercise guidelines and patient will follow up in 12 months in clinic to re-evaluate.   reports that she has never smoked. She has never used smokeless tobacco.        Appropriate preventive services were discussed with this patient, including applicable screening as appropriate for cardiovascular disease, diabetes, osteopenia/osteoporosis, and glaucoma.  As appropriate for age/gender, discussed screening for colorectal cancer, prostate cancer, breast cancer, and cervical cancer. Checklist reviewing preventive services available has been given to the patient.    Reviewed patients plan of care and provided an AVS. The Basic Care Plan (routine screening as documented in Health Maintenance) for Meggan meets the Care Plan requirement. This Care Plan has been established and reviewed with the Patient.    Counseling Resources:  ATP IV Guidelines  Pooled Cohorts Equation Calculator  Breast Cancer Risk Calculator  FRAX Risk Assessment  ICSI Preventive Guidelines  Dietary Guidelines for Americans, 2010  USDA's MyPlate  ASA Prophylaxis  Lung CA Screening    The information in this document, created by the medical scribe for me, accurately reflects the services I personally performed and the decisions made by me. I have reviewed and approved this document for accuracy prior to leaving the patient care area.  Adrianna Aparicio MD  [facility]  "

## 2017-11-01 NOTE — MR AVS SNAPSHOT
After Visit Summary   11/1/2017    Meggan Jones    MRN: [id number]           Patient Information     Date Of Birth          1950        Visit Information        Provider Department      11/1/2017 9:40 AM Adrianna Aparicio MD [facility]        Today's Diagnoses     Encounter for gynecological examination without abnormal finding    -  1    Essential hypertension, benign        Hyperlipidemia LDL goal <160        Morbid obesity (H)        Atypical squamous cells of undetermined significance on cytologic smear of cervix (ASC-US)        Encounter for screening mammogram for breast cancer        At risk for falling        Need for prophylactic vaccination with tetanus-diphtheria (TD)          Care Instructions    Exercise regularly for 30-40 minutes 5-6 days a week.    Eat a diet high in healthy fats (avocado, fish etc.) like a mediterranean diet.     Stop by the pharmacy and check in about tetanus and flu shot.     Urine test on your way out.     Preventive Health Recommendations    Female Ages 65 +    Yearly exam:     See your health care provider every year in order to  o Review health changes.   o Discuss preventive care.    o Review your medicines if your doctor has prescribed any.      You no longer need a yearly Pap test unless you've had an abnormal Pap test in the past 10 years. If you have vaginal symptoms, such as bleeding or discharge, be sure to talk with your provider about a Pap test.      Every 1 to 2 years, have a mammogram.  If you are over 69, talk with your health care provider about whether or not you want to continue having screening mammograms.      Every 10 years, have a colonoscopy. Or, have a yearly FIT test (stool test). These exams will check for colon cancer.       Have a cholesterol test every 5 years, or more often if your doctor advises it.       Have a diabetes test (fasting glucose) every three years. If you are at risk for diabetes, you should  have this test more often.       At age 65, have a bone density scan (DEXA) to check for osteoporosis (brittle bone disease).    Shots:    Get a flu shot each year.    Get a tetanus shot every 10 years.    Talk to your doctor about your pneumonia vaccines. There are now two you should receive - Pneumovax (PPSV 23) and Prevnar (PCV 13).    Talk to your doctor about the shingles vaccine.    Talk to your doctor about the hepatitis B vaccine.    Nutrition:     Eat at least 5 servings of fruits and vegetables each day.      Eat whole-grain bread, whole-wheat pasta and brown rice instead of white grains and rice.      Talk to your provider about Calcium and Vitamin D.     Lifestyle    Exercise at least 150 minutes a week (30 minutes a day, 5 days a week). This will help you control your weight and prevent disease.      Limit alcohol to one drink per day.      No smoking.       Wear sunscreen to prevent skin cancer.       See your dentist twice a year for an exam and cleaning.      See your eye doctor every 1 to 2 years to screen for conditions such as glaucoma, macular degeneration and cataracts.          Follow-ups after your visit        Future tests that were ordered for you today     Open Future Orders        Priority Expected Expires Ordered    *MA Screening Digital Bilateral Routine  11/1/2018 11/1/2017            Who to contact     If you have questions or need follow up information about today's clinic visit or your schedule please contact [facility] directly at [phone].  Normal or non-critical lab and imaging results will be communicated to you by MyChart, letter or phone within 4 business days after the clinic has received the results. If you do not hear from us within 7 days, please contact the clinic through MyChart or phone. If you have a critical or abnormal lab result, we will notify you by phone as soon as possible.  Submit refill requests through [email or web address] or call your pharmacy and they  "will forward the refill request to us. Please allow 3 business days for your refill to be completed.          Additional Information About Your Visit        LetsdeccoharVariable Information     Dojo gives you secure access to your electronic health record. If you see a primary care provider, you can also send messages to your care team and make appointments. If you have questions, please call your primary care clinic.  If you do not have a primary care provider, please call [phone] and they will assist you.        Care EveryWhere ID     This is your Care EveryWhere ID. This could be used by other organizations to access your [facility] medical records  [id number]        Your Vitals Were     Pulse Temperature Height Last Period Pulse Oximetry BMI (Body Mass Index)    86 96.1  F (35.6  C) (Tympanic) 5' 1.75\" (1.568 m) 01/26/2010 96% 39.85 kg/m2       Blood Pressure from Last 3 Encounters:   11/01/17 142/62   11/07/16 144/60   10/12/16 140/72    Weight from Last 3 Encounters:   11/01/17 216 lb 1.6 oz (98 kg)   05/10/17 215 lb (97.5 kg)   10/12/16 215 lb 5 oz (97.7 kg)              We Performed the Following     Albumin Random Urine Quantitative with Creat Ratio     HPV High Risk Types DNA Cervical     Pap imaged thin layer screen with HPV - recommended age 30 - 65 years (select HPV order below)          Today's Medication Changes          These changes are accurate as of: 11/1/17 10:25 AM.  If you have any questions, ask your nurse or doctor.               These medicines have changed or have updated prescriptions.        Dose/Directions    lisinopril-hydrochlorothiazide 20-25 MG per tablet   Commonly known as:  PRINZIDE/ZESTORETIC   This may have changed:  See the new instructions.   Used for:  Essential hypertension, benign   Changed by:  Adrianna Aparicio MD        Dose:  1 tablet   Take 1 tablet by mouth daily   Quantity:  90 tablet   Refills:  3            Where to get your medicines      These medications were " sent to [facility], [place] - [address]  [address] [facility] [place] [address]     Phone:  [phone]     atorvastatin 20 MG tablet    lisinopril-hydrochlorothiazide 20-25 MG per tablet                Primary Care Provider Office Phone # Fax #    Adrianna Aparicio [phone]       [facility] [address] [place] [address]        Equal Access to Services     [facility]: Hadii aad ku hadasho Soomaali, waaxda luqadaha, qaybta kaalmada adeegyada, waxay idiin hayaan adeeg kharash la'petern . So [facility] [phone].    ATENCIÓN: Si habla español, tiene a cho disposición servicios gratuitos de asistencia lingüística. [facility] [phone].    We comply with applicable federal civil rights laws and [place] laws. We do not discriminate on the basis of race, color, national origin, age, disability, sex, sexual orientation, or gender identity.            Thank you!     Thank you for choosing [facility]  for your care. Our goal is always to provide you with excellent care. Hearing back from our patients is one way we can continue to improve our services. Please take a few minutes to complete the written survey that you may receive in the mail after your visit with us. Thank you!             Your Updated Medication List - Protect others around you: Learn how to safely use, store and throw away your medicines at www.disposemymeds.org.          This list is accurate as of: 11/1/17 10:25 AM.  Always use your most recent med list.                   Brand Name Dispense Instructions for use Diagnosis    aspirin 81 MG tablet      1 tab po QD (Once per day)    Routine general medical examination at a health care facility       atorvastatin 20 MG tablet    LIPITOR    90 tablet    Take 1 tablet (20 mg) by mouth daily    Hyperlipidemia LDL goal <160       calcium + D 600-200 MG-UNIT Tabs   Generic drug:  calcium carbonate-vitamin D       Reported on 5/10/2017        CENTRUM SILVER per tablet      1 TABLET DAILY        FISH OIL CONCENTRATE 1000 MG Caps      1 capsule daily        GLUCOSAMINE CHONDR 500 COMPLEX PO      Take 500 mg by mouth daily        lisinopril-hydrochlorothiazide 20-25 MG per tablet    PRINZIDE/ZESTORETIC    90 tablet    Take 1 tablet by mouth daily    Essential hypertension, benign       vitamin E 400 UNIT capsule      Take 1 capsule by mouth daily.

## 2017-11-06 LAB
COPATH REPORT: NORMAL
PAP: NORMAL

## 2017-11-07 LAB
FINAL DIAGNOSIS: NORMAL
HPV HR 12 DNA CVX QL NAA+PROBE: NEGATIVE
HPV16 DNA SPEC QL NAA+PROBE: NEGATIVE
HPV18 DNA SPEC QL NAA+PROBE: NEGATIVE
SPECIMEN DESCRIPTION: NORMAL

## 2017-11-13 ENCOUNTER — HOSPITAL ENCOUNTER (OUTPATIENT)
Dept: MAMMOGRAPHY | Facility: CLINIC | Age: 67
Discharge: HOME | End: 2017-11-13
Attending: INTERNAL MEDICINE | Admitting: INTERNAL MEDICINE
Payer: MEDICARE

## 2017-11-13 DIAGNOSIS — Z12.31 ENCOUNTER FOR SCREENING MAMMOGRAM FOR BREAST CANCER: ICD-10-CM

## 2017-11-13 PROCEDURE — G0202 SCR MAMMO BI INCL CAD: HCPCS

## 2017-11-29 DIAGNOSIS — I10 ESSENTIAL HYPERTENSION, BENIGN: ICD-10-CM

## 2017-12-01 RX ORDER — LISINOPRIL AND HYDROCHLOROTHIAZIDE 20; 25 MG/1; MG/1
TABLET ORAL
Qty: 90 TABLET | Refills: 0
Start: 2017-12-01

## 2017-12-04 ENCOUNTER — ALLIED HEALTH/NURSE VISIT (OUTPATIENT)
Dept: NURSING | Facility: CLINIC | Age: 67
End: 2017-12-04
Payer: COMMERCIAL

## 2017-12-04 VITALS — SYSTOLIC BLOOD PRESSURE: 138 MMHG | DIASTOLIC BLOOD PRESSURE: 62 MMHG

## 2017-12-04 DIAGNOSIS — I10 ESSENTIAL HYPERTENSION, BENIGN: Primary | ICD-10-CM

## 2017-12-04 PROCEDURE — 99207 ZZC NO CHARGE NURSE ONLY: CPT

## 2017-12-04 NOTE — PROGRESS NOTES
Meggan Jones is a 67 year old female who comes in today for a Blood Pressure check because of ongoing blood pressure monitoring.    *Document pulse and BP  *Use new set of vitals button for multiple readings.  *Use extended vitals for orthostatic    Vitals as recorded, a large cuff was used.    Patient is taking medication as prescribed  Patient is tolerating medications well.  Patient is monitoring Blood Pressure at home.  Average readings if yes are 120-130/70s    Current complaints: none    Disposition: follow-up as indicated by MD/AP, results routed to MD/AP, patient to continue with the same medication unless changed by provider.

## 2017-12-04 NOTE — MR AVS SNAPSHOT
After Visit Summary   12/4/2017    Meggan Jones    MRN: [id number]           Patient Information     Date Of Birth          1950        Visit Information        Provider Department      12/4/2017 11:00 AM MADDY NURSE AB [facility]        Today's Diagnoses     Essential hypertension, benign    -  1       Follow-ups after your visit        Who to contact     If you have questions or need follow up information about today's clinic visit or your schedule please contact [facility] directly at [phone].  Normal or non-critical lab and imaging results will be communicated to you by MyChart, letter or phone within 4 business days after the clinic has received the results. If you do not hear from us within 7 days, please contact the clinic through SpiralFroghart or phone. If you have a critical or abnormal lab result, we will notify you by phone as soon as possible.  Submit refill requests through Natrix Separations or call your pharmacy and they will forward the refill request to us. Please allow 3 business days for your refill to be completed.          Additional Information About Your Visit        MyChart Information     [employer] gives you secure access to your electronic health record. If you see a primary care provider, you can also send messages to your care team and make appointments. If you have questions, please call your primary care clinic.  If you do not have a primary care provider, please call [phone] and they will assist you.        Care EveryWhere ID     This is your Care EveryWhere ID. This could be used by other organizations to access your [facility] medical records  [id number]        Your Vitals Were     Last Period                   01/26/2010            Blood Pressure from Last 3 Encounters:   12/04/17 138/62   11/01/17 142/62   11/07/16 144/60    Weight from Last 3 Encounters:   11/01/17 216 lb 1.6 oz (98 kg)   05/10/17 215 lb (97.5 kg)   10/12/16 215 lb 5 oz (97.7 kg)               Today, you had the following     No orders found for display       Primary Care Provider Office Phone # Fax #    Adrianna Aparicio [phone] [address] [place] [address]        Equal Access to Services     [facility] : Hadii jalen ku mercedeso Soomaali, waaxda luqadaha, qaybta kaalmada adesam, david adelaamber talbert lapanchomitzi barba. So [facility] [phone].    ATENCIÓN: Si habla español, tiene a cho disposición servicios gratuitos de asistencia lingüística. Llame al [phone].    We comply with applicable federal civil rights laws and [place] laws. We do not discriminate on the basis of race, color, national origin, age, disability, sex, sexual orientation, or gender identity.            Thank you!     Thank you for choosing [facility]  for your care. Our goal is always to provide you with excellent care. Hearing back from our patients is one way we can continue to improve our services. Please take a few minutes to complete the written survey that you may receive in the mail after your visit with us. Thank you!             Your Updated Medication List - Protect others around you: Learn how to safely use, store and throw away your medicines at www.disposemymeds.org.          This list is accurate as of: 12/4/17 11:17 AM.  Always use your most recent med list.                   Brand Name Dispense Instructions for use Diagnosis    aspirin 81 MG tablet      1 tab po QD (Once per day)    Routine general medical examination at a health care facility       atorvastatin 20 MG tablet    LIPITOR    90 tablet    Take 1 tablet (20 mg) by mouth daily    Hyperlipidemia LDL goal <160       calcium + D 600-200 MG-UNIT Tabs   Generic drug:  calcium carbonate-vitamin D      Reported on 5/10/2017        CENTRUM SILVER per tablet      1 TABLET DAILY        FISH OIL CONCENTRATE 1000 MG Caps      1 capsule daily        GLUCOSAMINE CHONDR 500 COMPLEX PO      Take  500 mg by mouth daily        lisinopril-hydrochlorothiazide 20-25 MG per tablet    PRINZIDE/ZESTORETIC    90 tablet    Take 1 tablet by mouth daily    Essential hypertension, benign       vitamin E 400 UNIT capsule      Take 1 capsule by mouth daily.

## 2018-05-31 ENCOUNTER — OFFICE VISIT (OUTPATIENT)
Dept: PEDIATRICS | Facility: CLINIC | Age: 68
End: 2018-05-31
Payer: COMMERCIAL

## 2018-05-31 VITALS
HEART RATE: 84 BPM | WEIGHT: 214.4 LBS | HEIGHT: 62 IN | BODY MASS INDEX: 39.45 KG/M2 | DIASTOLIC BLOOD PRESSURE: 68 MMHG | OXYGEN SATURATION: 96 % | TEMPERATURE: 98.1 F | SYSTOLIC BLOOD PRESSURE: 134 MMHG

## 2018-05-31 DIAGNOSIS — Z91.89 RISK OF EXPOSURE TO LYME DISEASE: Primary | ICD-10-CM

## 2018-05-31 DIAGNOSIS — M79.641 BILATERAL HAND PAIN: ICD-10-CM

## 2018-05-31 DIAGNOSIS — M79.642 BILATERAL HAND PAIN: ICD-10-CM

## 2018-05-31 DIAGNOSIS — I10 ESSENTIAL HYPERTENSION, BENIGN: ICD-10-CM

## 2018-05-31 PROCEDURE — 99214 OFFICE O/P EST MOD 30 MIN: CPT | Performed by: NURSE PRACTITIONER

## 2018-05-31 RX ORDER — DOXYCYCLINE 100 MG/1
200 CAPSULE ORAL ONCE
Qty: 2 CAPSULE | Refills: 0 | Status: SHIPPED | OUTPATIENT
Start: 2018-05-31 | End: 2018-05-31

## 2018-05-31 NOTE — PROGRESS NOTES
"  SUBJECTIVE:   Meggan Jones is a 67 year old female who presents to clinic today for the following health issues:    Has tick bite - unsure of how long - wants medication.  Tick bite      Duration: unsure - pulled off yesterday    Description (location/character/radiation): red area around bite    Intensity:  Mild - bothering her because it is on panty line    Therapies tried and outcome: triple antibiotic ointment: Symptoms not alleviated       Thinks she got bit as early as the 26th, more likely on the 27th. Found it in her left groin area on 5/30. States the tick was slightly engorged. No rash but has had a very slight headache but thinks that is due to dehydration due to the extreme heat. The headache went away after drinking lots of water. States she had lyme many years ago.       ROS: const/derm/neuro/cv otherwise negative     OBJECTIVE:  /68 (BP Location: Right arm, Cuff Size: Adult Large)  Pulse 84  Temp 98.1  F (36.7  C) (Tympanic)  Ht 5' 1.75\" (1.568 m)  Wt 214 lb 6.4 oz (97.3 kg)  LMP 01/26/2010  SpO2 96%  BMI 39.53 kg/m2  CONSTITUTIONAL: Alert, well-nourished, well-groomed, NAD  RESP: Lungs CTA. No wheeze, rhonchi, rales.  CV: HRRR S1 S2 No MRG. No peripheral edema  DERM: No rash. No erythema at site of tick bite.    ASSESSMENT/PLAN:  (Z91.89) Risk of exposure to Lyme disease  (primary encounter diagnosis)  Comment: Possible exposure to lyme. Had a tick on for between 24-72 hours and states tick was partially engorged. Was in [facility]. No lyme sx other than very mild headache that went away with fluid intake. I think that was more likely related to dehydration due to sever heat. Meets criteria for lyme prophylaxis.   Plan: doxycycline (VIBRAMYCIN) 100 MG capsule        -Call for any signs of lyme. Discussed reasons to seek care urgently.       (M79.641,  M79.642) Bilateral hand pain  Comment: Bilateral hand pain, worse if using hands a lot. Has some joint deformity (enlargening " knuckles) but no deformities to suggest RA. No morning hand pain or visible erythema or swelling  Plan:   -No need for rheum referral today   -Discussed pathophys of OA and supportive cares    (I10) Essential hypertension, benign  Comment: One elevated reading today, one normal. Asymptomatic. Has been elevated slightly on the last few visits.   Plan:   -Recheck in pharmacy in 2 weeks  -Recommended lifestyle changes.             Linda Maddox, AZAMP-DNP.

## 2018-05-31 NOTE — PATIENT INSTRUCTIONS
-BP recheck in 2 weeks at the pharmacy    -Take Doxycycline 2 capsules one time. Call if any signs of lyme such as rash, worsening headaches, fever, joint aches, slow heart rate.

## 2018-05-31 NOTE — MR AVS SNAPSHOT
After Visit Summary   5/31/2018    Meggan Jones    MRN: [id number]           Patient Information     Date Of Birth          1950        Visit Information        Provider Department      5/31/2018 9:00 AM [facility]        Today's Diagnoses     Risk of exposure to Lyme disease    -  1      Care Instructions    -BP recheck in 2 weeks at the pharmacy    -Take Doxycycline 2 capsules one time. Call if any signs of lyme such as rash, worsening headaches, fever, joint aches, slow heart rate.          Follow-ups after your visit        Follow-up notes from your care team     Return in about 2 weeks (around 6/14/2018) for BP check (pharmcy) and lab visit .      Who to contact     If you have questions or need follow up information about today's clinic visit or your schedule please contact [facility] directly at [phone].  Normal or non-critical lab and imaging results will be communicated to you by MyChart, letter or phone within 4 business days after the clinic has received the results. If you do not hear from us within 7 days, please contact the clinic through MyChart or phone. If you have a critical or abnormal lab result, we will notify you by phone as soon as possible.  Submit refill requests through OKWave or call your pharmacy and they will forward the refill request to us. Please allow 3 business days for your refill to be completed.          Additional Information About Your Visit        MyChart Information     OKWave gives you secure access to your electronic health record. If you see a primary care provider, you can also send messages to your care team and make appointments. If you have questions, please call your primary care clinic.  If you do not have a primary care provider, please call [phone] and they will assist you.        Care EveryWhere ID     This is your Care EveryWhere ID. This could be used by other organizations  to access your [facility] medical records  [id number]        Your Vitals Were     Last Period                   01/26/2010            Blood Pressure from Last 3 Encounters:   12/04/17 138/62   11/01/17 142/62   11/07/16 144/60    Weight from Last 3 Encounters:   11/01/17 216 lb 1.6 oz (98 kg)   05/10/17 215 lb (97.5 kg)   10/12/16 215 lb 5 oz (97.7 kg)              Today, you had the following     No orders found for display         Today's Medication Changes          These changes are accurate as of 5/31/18  9:27 AM.  If you have any questions, ask your nurse or doctor.               Start taking these medicines.        Dose/Directions    doxycycline 100 MG capsule   Commonly known as:  VIBRAMYCIN   Used for:  Risk of exposure to Lyme disease        Dose:  200 mg   Take 2 capsules (200 mg) by mouth once for 1 dose   Quantity:  2 capsule   Refills:  0            Where to get your medicines      These medications were sent to [facility] [address] [place] [address]     Phone:  [phone]     doxycycline 100 MG capsule                Primary Care Provider Office Phone # Fax #    Adrianna Aparicio [phone]       [address] [place] [address] [address] [place] [address]        Equal Access to Services     [facility]: Hadii jalen ku hadasho Soomaali, waaxda luqadaha, qaybta kaalmada adeegyada, david martel hayrush barba. So [facility] [place] [facility] [phone].    ATENCIÓN: Si habla español, tiene a cho disposición servicios gratuitos de asistencia lingüística. Llame al [phone].    We comply with applicable federal civil rights laws and [place] laws. We do not discriminate on the basis of race, color, national origin, age, disability, sex, sexual orientation, or gender identity.            Thank you!     Thank you for choosing [facility]  for your care. Our goal is always to provide you with excellent  care. Hearing back from our patients is one way we can continue to improve our services. Please take a few minutes to complete the written survey that you may receive in the mail after your visit with us. Thank you!             Your Updated Medication List - Protect others around you: Learn how to safely use, store and throw away your medicines at www.disposemymeds.org.          This list is accurate as of 5/31/18  9:27 AM.  Always use your most recent med list.                   Brand Name Dispense Instructions for use Diagnosis    aspirin 81 MG tablet      1 tab po QD (Once per day)    Routine general medical examination at a health care facility       atorvastatin 20 MG tablet    LIPITOR    90 tablet    Take 1 tablet (20 mg) by mouth daily    Hyperlipidemia LDL goal <160       calcium + D 600-200 MG-UNIT Tabs   Generic drug:  calcium carbonate-vitamin D      Reported on 5/10/2017        CENTRUM SILVER per tablet      1 TABLET DAILY        doxycycline 100 MG capsule    VIBRAMYCIN    2 capsule    Take 2 capsules (200 mg) by mouth once for 1 dose    Risk of exposure to Lyme disease       FISH OIL CONCENTRATE 1000 MG Caps      1 capsule daily        GLUCOSAMINE CHONDR 500 COMPLEX PO      Take 500 mg by mouth daily        lisinopril-hydrochlorothiazide 20-25 MG per tablet    PRINZIDE/ZESTORETIC    90 tablet    Take 1 tablet by mouth daily    Essential hypertension, benign       vitamin E 400 UNIT capsule      Take 1 capsule by mouth daily.

## 2018-10-22 ENCOUNTER — TELEPHONE (OUTPATIENT)
Dept: PEDIATRICS | Facility: CLINIC | Age: 68
End: 2018-10-22

## 2018-10-22 ENCOUNTER — TRANSFERRED RECORDS (OUTPATIENT)
Dept: HEALTH INFORMATION MANAGEMENT | Facility: CLINIC | Age: 68
End: 2018-10-22

## 2018-10-22 NOTE — TELEPHONE ENCOUNTER
Reason for call:  Patient reporting a symptom    Symptom or request: was exposed to strept, fever for 4 days.    Duration (how long have symptoms been present): thursday    Have you been treated for this before? No    Additional comments: would like a strept test    Phone Number patient can be reached at:  Home number on file [phone] (home)    Best Time:  any    Can we leave a detailed message on this number:  YES    Call taken on 10/22/2018 at 4:23 PM by Kaylie Burrell

## 2018-10-23 NOTE — TELEPHONE ENCOUNTER
Pt was seen in urgent care last night and strep test was negative.  She is being treated for viral pharyngitis.      Mouna Rice CMA ([facility])

## 2018-11-13 ENCOUNTER — OFFICE VISIT (OUTPATIENT)
Dept: PEDIATRICS | Facility: CLINIC | Age: 68
End: 2018-11-13
Payer: COMMERCIAL

## 2018-11-13 VITALS
HEIGHT: 62 IN | OXYGEN SATURATION: 95 % | BODY MASS INDEX: 39.75 KG/M2 | HEART RATE: 91 BPM | TEMPERATURE: 99.1 F | SYSTOLIC BLOOD PRESSURE: 122 MMHG | DIASTOLIC BLOOD PRESSURE: 68 MMHG | WEIGHT: 216 LBS

## 2018-11-13 DIAGNOSIS — I10 ESSENTIAL HYPERTENSION, BENIGN: ICD-10-CM

## 2018-11-13 DIAGNOSIS — E78.5 HYPERLIPIDEMIA LDL GOAL <160: ICD-10-CM

## 2018-11-13 DIAGNOSIS — Z12.31 ENCOUNTER FOR SCREENING MAMMOGRAM FOR BREAST CANCER: ICD-10-CM

## 2018-11-13 DIAGNOSIS — E66.01 MORBID OBESITY (H): ICD-10-CM

## 2018-11-13 DIAGNOSIS — Z00.00 ENCOUNTER FOR WELL ADULT EXAM WITHOUT ABNORMAL FINDINGS: Primary | ICD-10-CM

## 2018-11-13 PROCEDURE — 99397 PER PM REEVAL EST PAT 65+ YR: CPT | Performed by: INTERNAL MEDICINE

## 2018-11-13 RX ORDER — LISINOPRIL AND HYDROCHLOROTHIAZIDE 20; 25 MG/1; MG/1
1 TABLET ORAL DAILY
Qty: 90 TABLET | Refills: 3 | Status: SHIPPED | OUTPATIENT
Start: 2018-11-13 | End: 2019-10-15

## 2018-11-13 RX ORDER — ATORVASTATIN CALCIUM 20 MG/1
20 TABLET, FILM COATED ORAL DAILY
Qty: 90 TABLET | Refills: 3 | Status: SHIPPED | OUTPATIENT
Start: 2018-11-13 | End: 2019-10-15

## 2018-11-13 ASSESSMENT — ENCOUNTER SYMPTOMS
EYE PAIN: 0
DIARRHEA: 0
CONSTIPATION: 0
ABDOMINAL PAIN: 0
DIZZINESS: 0
HEMATOCHEZIA: 0
HEMATURIA: 0
COUGH: 0
CHILLS: 0

## 2018-11-13 ASSESSMENT — ACTIVITIES OF DAILY LIVING (ADL): CURRENT_FUNCTION: NO ASSISTANCE NEEDED

## 2018-11-13 NOTE — PATIENT INSTRUCTIONS
Can try paraffin wax bath for arthritis. Continue to use the joints to keep them from arthritis.     Need to find an activity you can do in the winter months. Watch portion sizes.     Stop at the pharmacy and they will run your insurance to see if the shingles vaccination is covered; they can give you the shot at the pharmacy.     Lab on Tue Nov 20th at 8 AM.     Preventive Health Recommendations    See your health care provider every year to    Review health changes.     Discuss preventive care.      Review your medicines if your doctor has prescribed any.      You no longer need a yearly Pap test unless you've had an abnormal Pap test in the past 10 years. If you have vaginal symptoms, such as bleeding or discharge, be sure to talk with your provider about a Pap test.      Every 1 to 2 years, have a mammogram.  If you are over 69, talk with your health care provider about whether or not you want to continue having screening mammograms.      Every 10 years, have a colonoscopy. Or, have a yearly FIT test (stool test). These exams will check for colon cancer.       Have a cholesterol test every 5 years, or more often if your doctor advises it.       Have a diabetes test (fasting glucose) every three years. If you are at risk for diabetes, you should have this test more often.       At age 65, have a bone density scan (DEXA) to check for osteoporosis (brittle bone disease).    Shots:    Get a flu shot each year.    Get a tetanus shot every 10 years.    Talk to your doctor about your pneumonia vaccines. There are now two you should receive - Pneumovax (PPSV 23) and Prevnar (PCV 13).    Talk to your pharmacist about the shingles vaccine.    Talk to your doctor about the hepatitis B vaccine.    Nutrition:     Eat at least 5 servings of fruits and vegetables each day.      Eat whole-grain bread, whole-wheat pasta and brown rice instead of white grains and rice.      Get adequate Calcium and Vitamin D.      Lifestyle    Exercise at least 150 minutes a week (30 minutes a day, 5 days a week). This will help you control your weight and prevent disease.      Limit alcohol to one drink per day.      No smoking.       Wear sunscreen to prevent skin cancer.       See your dentist twice a year for an exam and cleaning.      See your eye doctor every 1 to 2 years to screen for conditions such as glaucoma, macular degeneration and cataracts.    Personalized Prevention Plan  You are due for the preventive services outlined below.  Your care team is available to assist you in scheduling these services.  If you have already completed any of these items, please share that information with your care team to update in your medical record.  Health Maintenance Due   Topic Date Due     Tetanus Vaccine - every 10 years  11/19/2017     Flu Vaccine (1) 09/01/2018     Comprehensive Metabolic Lab - yearly  10/28/2018     Cholesterol Lab - yearly  10/28/2018     Microalbumin Lab - yearly  11/01/2018     FALL RISK ASSESSMENT  11/01/2018     Depression Assessment 2 - yearly  11/01/2018     Mammogram - yearly  11/13/2018

## 2018-11-13 NOTE — MR AVS SNAPSHOT
After Visit Summary   11/13/2018    Meggan Jones    MRN: [id number]           Patient Information     Date Of Birth          1950        Visit Information        Provider Department      11/13/2018 2:40 PM Adrianna Aparicio MD [facility]        Today's Diagnoses     Encounter for well adult exam without abnormal findings    -  1    Hyperlipidemia LDL goal <160        Essential hypertension, benign        Morbid obesity (H)        Encounter for screening mammogram for breast cancer          Care Instructions    Can try paraffin wax bath for arthritis. Continue to use the joints to keep them from arthritis.     Need to find an activity you can do in the winter months. Watch portion sizes.     Stop at the pharmacy and they will run your insurance to see if the shingles vaccination is covered; they can give you the shot at the pharmacy.     Lab on Tue Nov 20th at 8 AM.     Preventive Health Recommendations    See your health care provider every year to    Review health changes.     Discuss preventive care.      Review your medicines if your doctor has prescribed any.      You no longer need a yearly Pap test unless you've had an abnormal Pap test in the past 10 years. If you have vaginal symptoms, such as bleeding or discharge, be sure to talk with your provider about a Pap test.      Every 1 to 2 years, have a mammogram.  If you are over 69, talk with your health care provider about whether or not you want to continue having screening mammograms.      Every 10 years, have a colonoscopy. Or, have a yearly FIT test (stool test). These exams will check for colon cancer.       Have a cholesterol test every 5 years, or more often if your doctor advises it.       Have a diabetes test (fasting glucose) every three years. If you are at risk for diabetes, you should have this test more often.       At age 65, have a bone density scan (DEXA) to check for osteoporosis (brittle bone  disease).    Shots:    Get a flu shot each year.    Get a tetanus shot every 10 years.    Talk to your doctor about your pneumonia vaccines. There are now two you should receive - Pneumovax (PPSV 23) and Prevnar (PCV 13).    Talk to your pharmacist about the shingles vaccine.    Talk to your doctor about the hepatitis B vaccine.    Nutrition:     Eat at least 5 servings of fruits and vegetables each day.      Eat whole-grain bread, whole-wheat pasta and brown rice instead of white grains and rice.      Get adequate Calcium and Vitamin D.     Lifestyle    Exercise at least 150 minutes a week (30 minutes a day, 5 days a week). This will help you control your weight and prevent disease.      Limit alcohol to one drink per day.      No smoking.       Wear sunscreen to prevent skin cancer.       See your dentist twice a year for an exam and cleaning.      See your eye doctor every 1 to 2 years to screen for conditions such as glaucoma, macular degeneration and cataracts.    Personalized Prevention Plan  You are due for the preventive services outlined below.  Your care team is available to assist you in scheduling these services.  If you have already completed any of these items, please share that information with your care team to update in your medical record.  Health Maintenance Due   Topic Date Due     Tetanus Vaccine - every 10 years  11/19/2017     Flu Vaccine (1) 09/01/2018     Comprehensive Metabolic Lab - yearly  10/28/2018     Cholesterol Lab - yearly  10/28/2018     Microalbumin Lab - yearly  11/01/2018     FALL RISK ASSESSMENT  11/01/2018     Depression Assessment 2 - yearly  11/01/2018     Mammogram - yearly  11/13/2018             Follow-ups after your visit        Follow-up notes from your care team     Return in about 1 year (around 11/13/2019) for Physical Exam.      Your next 10 appointments already scheduled     Nov 20, 2018  8:00 AM CST   LAB with EA LAB   [facility] ([facility])    [address]  [address]  [place] [address] [phone]           Please do not eat 10-12 hours before your appointment if you are coming in fasting for labs on lipids, cholesterol, or glucose (sugar). This does not apply to pregnant women. Water, hot tea and black coffee (with nothing added) are okay. Do not drink other fluids, diet soda or chew gum.              Future tests that were ordered for you today     Open Future Orders        Priority Expected Expires Ordered    Lipid panel reflex to direct LDL Fasting Routine  2/13/2019 11/13/2018    Comprehensive metabolic panel Routine  2/13/2019 11/13/2018    Albumin Random Urine Quantitative with Creat Ratio Routine  2/13/2019 11/13/2018    *MA Screening Digital Bilateral Routine  11/12/2019 11/13/2018            Who to contact     If you have questions or need follow up information about today's clinic visit or your schedule please contact [facility] directly at [phone].  Normal or non-critical lab and imaging results will be communicated to you by Fuse Sciencet, letter or phone within 4 business days after the clinic has received the results. If you do not hear from us within 7 days, please contact the clinic through Zeuss or phone. If you have a critical or abnormal lab result, we will notify you by phone as soon as possible.  Submit refill requests through Zeuss or call your pharmacy and they will forward the refill request to us. Please allow 3 business days for your refill to be completed.          Additional Information About Your Visit        Zeuss Information     Zeuss gives you secure access to your electronic health record. If you see a primary care provider, you can also send messages to your care team and make appointments. If you have questions, please call your primary care clinic.  If you do not have a primary care provider, please call [phone] and they will assist you.        Care EveryWhere  "ID     This is your Care EveryWhere ID. This could be used by other organizations to access your [facility] medical records  [id number]        Your Vitals Were     Pulse Temperature Height Last Period Pulse Oximetry BMI (Body Mass Index)    91 99.1  F (37.3  C) (Oral) 5' 1.52\" (1.563 m) 01/26/2010 95% 40.13 kg/m2       Blood Pressure from Last 3 Encounters:   11/13/18 122/68   05/31/18 134/68   12/04/17 138/62    Weight from Last 3 Encounters:   11/13/18 216 lb (98 kg)   05/31/18 214 lb 6.4 oz (97.3 kg)   11/01/17 216 lb 1.6 oz (98 kg)                 Where to get your medicines      These medications were sent to [facility] [address] [place] [address] [address] [place] [address] SO.  [address] [place] [address]     Phone:  [phone]     atorvastatin 20 MG tablet    lisinopril-hydrochlorothiazide 20-25 MG per tablet          Primary Care Provider Office Phone # Fax #    Adrianna Aparicio [phone] [address] [place] [address]        Equal Access to Services     PARDEEP HERNANDEZ AH: Hadii jalen ku hadasho Soomaali, waaxda luqadaha, qaybta kaalmada adeegyada, waxay delonte barba. So [facility] [phone].    ATENCIÓN: Si habla español, tiene a cho disposición servicios gratuitos de asistencia lingüística. Llame al [phone].    We comply with applicable federal civil rights laws and [place] laws. We do not discriminate on the basis of race, color, national origin, age, disability, sex, sexual orientation, or gender identity.            Thank you!     Thank you for choosing [facility]  for your care. Our goal is always to provide you with excellent care. Hearing back from our patients is one way we can continue to improve our services. Please take a few minutes to complete the written survey that you may receive in the mail after your visit with us. Thank you!             Your Updated Medication List - Protect others " around you: Learn how to safely use, store and throw away your medicines at www.disposemymeds.org.          This list is accurate as of 11/13/18  3:34 PM.  Always use your most recent med list.                   Brand Name Dispense Instructions for use Diagnosis    aspirin 81 MG tablet      1 tab po QD (Once per day)    Routine general medical examination at a health care facility       atorvastatin 20 MG tablet    LIPITOR    90 tablet    Take 1 tablet (20 mg) by mouth daily    Hyperlipidemia LDL goal <160       calcium + D 600-200 MG-UNIT Tabs   Generic drug:  calcium carbonate-vitamin D      Reported on 5/10/2017        CENTRUM SILVER per tablet      1 TABLET DAILY        FISH OIL CONCENTRATE 1000 MG Caps      1 capsule daily        IBUPROFEN PO      Take 200 mg by mouth as needed for moderate pain        lisinopril-hydrochlorothiazide 20-25 MG per tablet    PRINZIDE/ZESTORETIC    90 tablet    Take 1 tablet by mouth daily    Essential hypertension, benign       polyethylene glycol 0.4%- propylene glycol 0.3% 0.4-0.3 % Soln ophthalmic solution    SYSTANE ULTRA     Place 2 drops into both eyes 2 times daily as needed for dry eyes        UNABLE TO FIND      5,000 mcg daily MEDICATION NAME: cologen        vitamin E 400 UNIT capsule      Take 1 capsule by mouth daily.

## 2018-11-13 NOTE — PROGRESS NOTES
"SUBJECTIVE:   Meggan Jones is a 68 year old female who presents for Preventive Visit.  Are you in the first 12 months of your Medicare coverage?  No    Annual Wellness Visit     In general, how would you rate your overall health?  Good    Frequency of exercise:  2-3 days/week    Duration of exercise:  15-30 minutes    Do you usually eat at least 4 servings of fruit and vegetables a day, include whole grains    & fiber and avoid regularly eating high fat or \"junk\" foods?  Yes    Taking medications regularly:  Yes    Medication side effects:  Other    Ability to successfully perform activities of daily living:  No assistance needed    Home Safety:  Throw rugs in the hallway and lack of grab bars in the bathroom    Hearing Impairment:  Difficulty following a conversation in a noisy restaurant or crowded room, need to ask people to speak up or repeat themselves and difficulty understanding soft or whispered speech    In the past 6 months, have you been bothered by leaking of urine?  No    In general, how would you rate your overall mental or emotional health?  Good    PHQ-2 Total Score: 0    Additional concerns today:  No    Patient reports she is developing some arthritis in her fingers. Her knees and hips have been bothering her as well. She takes Ibuprofen, extra pill before a long day of activity or night.     Had implant placed with a bone graft for one of her teeth that cracked up into the root. Developed an infection with canker sores and ended up in the emergency department. Has been running a low grade temp (99.4-99.8) she thinks is due to healing.     Patient wondering about company recommended for hearing aids.     Fall risk:  Fallen 2 or more times in the past year?: No  Any fall with injury in the past year?: No    COGNITIVE SCREEN  1) Repeat 3 items (Leader, Season, Table)    2) Clock draw: NORMAL  3) 3 item recall: Recalls 2 objects   Results: NORMAL clock, 1-2 items recalled: COGNITIVE IMPAIRMENT LESS " LIKELY    Mini-CogTM Copyright ROSSY Hays. Licensed by the author for use in [facility]; reprinted with permission (julito@[facility]). All rights reserved.        Reviewed and updated as needed this visit by clinical staff  Allergies  Meds         Reviewed and updated as needed this visit by Provider        Social History   Substance Use Topics     Smoking status: Never Smoker     Smokeless tobacco: Never Used     Alcohol use Yes      Comment: rarely       Alcohol Use 11/13/2018   If you drink alcohol do you typically have greater than 3 drinks per day OR greater than 7 drinks per week? No   No flowsheet data found.        Do you feel safe in your environment - Yes    Do you have a Health Care Directive?: No: Advance care planning reviewed with patient; information given to patient to review.    Current providers sharing in care for this patient include:   Patient Care Team:  Adrianna Aparicio MD as PCP - General (Pediatrics)    The following health maintenance items are reviewed in Epic and correct as of today:  Health Maintenance   Topic Date Due     TETANUS Q10 YR  11/19/2017     CMP Q1 YR  10/28/2018     LIPID MONITORING Q1 YEAR  10/28/2018     MICROALBUMIN Q1 YEAR  11/01/2018     FALL RISK ASSESSMENT  11/01/2018     PHQ-2 Q1 YR  11/01/2018     MAMMO Q1 YR  11/13/2018     COLONOSCOPY Q5 YR  06/30/2020     ADVANCE DIRECTIVE PLANNING Q5 YRS  08/18/2020     DEXA Q5 YR  10/15/2020     PNEUMOCOCCAL  Completed     INFLUENZA VACCINE  Addressed     HEPATITIS C SCREENING  Completed     Labs reviewed in EPIC  BP Readings from Last 3 Encounters:   11/13/18 122/68   05/31/18 134/68   12/04/17 138/62    Wt Readings from Last 3 Encounters:   11/13/18 98 kg (216 lb)   05/31/18 97.3 kg (214 lb 6.4 oz)   11/01/17 98 kg (216 lb 1.6 oz)                  Patient Active Problem List   Diagnosis     COMMUNIC DIS CONTACT WC  DOI 12/3/01     Allergic rhinitis     Obesity     Essential hypertension, benign     Dry eyes      CARDIOVASCULAR SCREENING; LDL GOAL LESS THAN 130     Osteopenia     Hypertriglyceridemia     Advanced directives, counseling/discussion     Family history of hypothyroidism     Menopausal state     Hyperlipidemia LDL goal <160     Morbid obesity (H)     Past Surgical History:   Procedure Laterality Date     BIOPSY OF BREAST, NEEDLE CORE      Negative-left side      C LIGATE FALLOPIAN TUBE,POSTPARTUM      Tubal Ligation     C NONSPECIFIC PROCEDURE      conization of cervix -long ago? time       Social History   Substance Use Topics     Smoking status: Never Smoker     Smokeless tobacco: Never Used     Alcohol use Yes      Comment: rarely     Family History   Problem Relation Age of Onset     C.A.D. Mother      heart valve replacement- from heart failure at of death at 85     Cancer Father      Prostate, from cardiac arrest at age of 81     Cancer Brother      Prostate,heart valve replacemend at ageo f 73-alive      C.A.D. Brother      MI at 71-3 brothers     Family History Negative Brother      one  brother  at age of 69     Family History Negative Sister      6 sisters-one sister with rheumatic fever and related haert murmur, 2 sis iwth htn     Breast Cancer Sister      Breast Cancer Other      niece     Other Cancer Sister      Melanoma     Diabetes No family hx of      Cancer - colorectal No family hx of          Current Outpatient Prescriptions   Medication Sig Dispense Refill     ASPIRIN 81 MG OR TABS 1 tab po QD (Once per day)       atorvastatin (LIPITOR) 20 MG tablet Take 1 tablet (20 mg) by mouth daily 90 tablet 3     CALCIUM + D 600-200 MG-UNIT PO TABS Reported on 5/10/2017  3     CENTRUM SILVER OR TABS 1 TABLET DAILY       FISH OIL CONCENTRATE OR CAPS 1 capsule daily       IBUPROFEN PO Take 200 mg by mouth as needed for moderate pain       lisinopril-hydrochlorothiazide (PRINZIDE/ZESTORETIC) 20-25 MG per tablet Take 1 tablet by mouth daily 90 tablet 3     polyethylene glycol  "0.4%- propylene glycol 0.3% (SYSTANE ULTRA) 0.4-0.3 % SOLN ophthalmic solution Place 2 drops into both eyes 2 times daily as needed for dry eyes       UNABLE TO FIND 5,000 mcg daily MEDICATION NAME: cologen       vitamin E 400 UNIT capsule Take 1 capsule by mouth daily.       Allergies   Allergen Reactions     Dust Mite Extract      Flu Virus Vaccine      Bad reaction     Iodine      Mold      Pollen [Pollen Extract]      Shellfish Allergy        Review of Systems   Constitutional: Negative for chills.   HENT: Negative for ear pain.    Eyes: Negative for pain.   Respiratory: Negative for cough.    Cardiovascular: Negative for chest pain.   Gastrointestinal: Negative for abdominal pain, constipation, diarrhea and hematochezia.   Genitourinary: Negative for hematuria.   Neurological: Negative for dizziness.       This document serves as a record of the services and decisions personally performed and made by Adrianna Aparicio MD. It was created on her behalf by Brooklyn Graves, a trained medical scribe. The creation of this document is based the provider's statements to the medical scribe.    Brooklyn Graves November 13, 2018 3:16 PM  OBJECTIVE:   /68  Pulse 91  Temp 99.1  F (37.3  C) (Oral)  Ht 1.563 m (5' 1.52\")  Wt 98 kg (216 lb)  LMP 01/26/2010  SpO2 95%  BMI 40.13 kg/m2 Estimated body mass index is 40.13 kg/(m^2) as calculated from the following:    Height as of this encounter: 1.563 m (5' 1.52\").    Weight as of this encounter: 98 kg (216 lb).  Physical Exam  GENERAL APPEARANCE: healthy, alert and no distress  EYES: Eyes grossly normal to inspection, PERRL and conjunctivae and sclerae normal  HENT: ear canals and TM's normal, nose and mouth without ulcers or lesions, oropharynx clear and oral mucous membranes moist  NECK: no adenopathy, no asymmetry, masses, or scars and thyroid normal to palpation  RESP: lungs clear to auscultation - no rales, rhonchi or wheezes  BREAST: normal without masses, " tenderness or nipple discharge and no palpable axillary masses or adenopathy  CV: regular rate and rhythm, normal S1 S2, no S3 or S4, no murmur, click or rub, no peripheral edema   ABDOMEN: soft, nontender, no hepatosplenomegaly, no masses and bowel sounds normal  MS: no musculoskeletal defects are noted and gait is age appropriate without ataxia  SKIN: no suspicious lesions or rashes  NEURO: Normal strength and tone, sensory exam grossly normal, mentation intact and speech normal  PSYCH: mentation appears normal and affect normal/bright    Diagnostic Test Results:  none     ASSESSMENT / PLAN:   (Z00.00) Encounter for well adult exam without abnormal findings  (primary encounter diagnosis)  -- imm utd; due for tdap and shingrix, patient to inquire at pharmacy about cost    -- colonoscopy utd   -- mammo due   -- colonoscopy utd  -- DEXA utd   -- encouraged regular exercise and balanced diet         (E78.5) Hyperlipidemia LDL goal <160  -- fasting labs scheduled   -- continue with current medications without changes   Plan: atorvastatin (LIPITOR) 20 MG tablet, Lipid         panel reflex to direct LDL Fasting,         Comprehensive metabolic panel        (I10) Essential hypertension, benign  -- BP at goal; 122/68 in clinic   -- continue with current medications without changes   Plan: lisinopril-hydrochlorothiazide         (PRINZIDE/ZESTORETIC) 20-25 MG per tablet,         Albumin Random Urine Quantitative with Creat         Ratio          (E66.01) Morbid obesity (H)  -- encouraged regular exercise and balanced diet       (Z12.31) Encounter for screening mammogram for breast cancer  -- due for mammogram   Plan: *MA Screening Digital Bilateral    Follow up for annual care or as needed         End of Life Planning:  Patient currently has an advanced directive: Yes.  Practitioner is supportive of decision.    COUNSELING:  Reviewed preventive health counseling, as reflected in patient instructions       Regular exercise     "   Healthy diet/nutrition       Vision screening    BP Readings from Last 1 Encounters:   11/13/18 122/68     Estimated body mass index is 40.13 kg/(m^2) as calculated from the following:    Height as of this encounter: 1.563 m (5' 1.52\").    Weight as of this encounter: 98 kg (216 lb).      Weight management plan: Discussed healthy diet and exercise guidelines and patient will follow up in 12 months in clinic to re-evaluate.     reports that she has never smoked. She has never used smokeless tobacco.      Appropriate preventive services were discussed with this patient, including applicable screening as appropriate for cardiovascular disease, diabetes, osteopenia/osteoporosis, and glaucoma.  As appropriate for age/gender, discussed screening for colorectal cancer, prostate cancer, breast cancer, and cervical cancer. Checklist reviewing preventive services available has been given to the patient.    Reviewed patients plan of care and provided an AVS. The Basic Care Plan (routine screening as documented in Health Maintenance) for Meggan meets the Care Plan requirement. This Care Plan has been established and reviewed with the Patient.    Counseling Resources:  ATP IV Guidelines  Pooled Cohorts Equation Calculator  Breast Cancer Risk Calculator  FRAX Risk Assessment  ICSI Preventive Guidelines  Dietary Guidelines for Americans, 2010  USDA's MyPlate  ASA Prophylaxis  Lung CA Screening      The information in this document, created by the medical scribe for me, accurately reflects the services I personally performed and the decisions made by me. I have reviewed and approved this document for accuracy prior to leaving the patient care area.  Adrianna Aparicio MD  [facility]  "

## 2018-11-20 DIAGNOSIS — E78.5 HYPERLIPIDEMIA LDL GOAL <160: ICD-10-CM

## 2018-11-20 DIAGNOSIS — I10 ESSENTIAL HYPERTENSION, BENIGN: ICD-10-CM

## 2018-11-20 LAB
ALBUMIN SERPL-MCNC: 3.7 G/DL (ref 3.4–5)
ALP SERPL-CCNC: 85 U/L (ref 40–150)
ALT SERPL W P-5'-P-CCNC: 32 U/L (ref 0–50)
ANION GAP SERPL CALCULATED.3IONS-SCNC: 9 MMOL/L (ref 3–14)
AST SERPL W P-5'-P-CCNC: 24 U/L (ref 0–45)
BILIRUB SERPL-MCNC: 0.6 MG/DL (ref 0.2–1.3)
BUN SERPL-MCNC: 19 MG/DL (ref 7–30)
CALCIUM SERPL-MCNC: 9.4 MG/DL (ref 8.5–10.1)
CHLORIDE SERPL-SCNC: 101 MMOL/L (ref 94–109)
CHOLEST SERPL-MCNC: 120 MG/DL
CO2 SERPL-SCNC: 26 MMOL/L (ref 20–32)
CREAT SERPL-MCNC: 0.79 MG/DL (ref 0.52–1.04)
GFR SERPL CREATININE-BSD FRML MDRD: 72 ML/MIN/1.7M2
GLUCOSE SERPL-MCNC: 83 MG/DL (ref 70–99)
HDLC SERPL-MCNC: 39 MG/DL
LDLC SERPL CALC-MCNC: 59 MG/DL
NONHDLC SERPL-MCNC: 81 MG/DL
POTASSIUM SERPL-SCNC: 4 MMOL/L (ref 3.4–5.3)
PROT SERPL-MCNC: 8.2 G/DL (ref 6.8–8.8)
SODIUM SERPL-SCNC: 136 MMOL/L (ref 133–144)
TRIGL SERPL-MCNC: 110 MG/DL

## 2018-11-20 PROCEDURE — 80061 LIPID PANEL: CPT | Performed by: INTERNAL MEDICINE

## 2018-11-20 PROCEDURE — 36415 COLL VENOUS BLD VENIPUNCTURE: CPT | Performed by: INTERNAL MEDICINE

## 2018-11-20 PROCEDURE — 80053 COMPREHEN METABOLIC PANEL: CPT | Performed by: INTERNAL MEDICINE

## 2018-11-20 PROCEDURE — 82043 UR ALBUMIN QUANTITATIVE: CPT | Performed by: INTERNAL MEDICINE

## 2018-11-21 LAB
CREAT UR-MCNC: 228 MG/DL
MICROALBUMIN UR-MCNC: 35 MG/L
MICROALBUMIN/CREAT UR: 15.53 MG/G CR (ref 0–25)

## 2018-12-06 ENCOUNTER — HOSPITAL ENCOUNTER (OUTPATIENT)
Dept: MAMMOGRAPHY | Facility: CLINIC | Age: 68
Discharge: HOME | End: 2018-12-06
Attending: INTERNAL MEDICINE | Admitting: INTERNAL MEDICINE
Payer: MEDICARE

## 2018-12-06 DIAGNOSIS — Z12.31 ENCOUNTER FOR SCREENING MAMMOGRAM FOR BREAST CANCER: ICD-10-CM

## 2018-12-06 PROCEDURE — 77067 SCR MAMMO BI INCL CAD: CPT

## 2018-12-29 ENCOUNTER — NURSE TRIAGE (OUTPATIENT)
Dept: NURSING | Facility: CLINIC | Age: 68
End: 2018-12-29

## 2018-12-29 ENCOUNTER — TRANSFERRED RECORDS (OUTPATIENT)
Dept: HEALTH INFORMATION MANAGEMENT | Facility: CLINIC | Age: 68
End: 2018-12-29

## 2018-12-29 NOTE — TELEPHONE ENCOUNTER
Meggan is calling regarding symptoms and today a real sore throat and a high fever and body aches.  Symptoms started on Wed with fevers of 102.5  Glands are swollen on neck and has a stiff neck.  Temp currently is 98.6.      Reason for Disposition    [1] Single large node AND [2] size > 1 inch (2.5 cm) AND [3] fever    Additional Information    Negative: Severe difficulty breathing (e.g., struggling for each breath, speaks in single words)    Negative: [1] Node is in the neck AND [2] causes difficulty breathing    Negative: [1] Node is in the neck AND [2] can't swallow fluids    Negative: Fever > 103 F (39.4 C)    Negative: [1] Lump or swelling in groin AND [2] pulsating (like heartbeat)    Negative: Patient sounds very sick or weak to the triager    Protocols used: LYMPH NODES SWOLLEN-ADULT-

## 2019-01-07 ENCOUNTER — OFFICE VISIT (OUTPATIENT)
Dept: PEDIATRICS | Facility: CLINIC | Age: 69
End: 2019-01-07
Payer: COMMERCIAL

## 2019-01-07 VITALS
RESPIRATION RATE: 20 BRPM | BODY MASS INDEX: 39.22 KG/M2 | TEMPERATURE: 98.8 F | WEIGHT: 211.1 LBS | OXYGEN SATURATION: 99 % | SYSTOLIC BLOOD PRESSURE: 158 MMHG | HEART RATE: 102 BPM | DIASTOLIC BLOOD PRESSURE: 64 MMHG

## 2019-01-07 DIAGNOSIS — I10 ESSENTIAL HYPERTENSION, BENIGN: ICD-10-CM

## 2019-01-07 DIAGNOSIS — J02.9 VIRAL PHARYNGITIS: Primary | ICD-10-CM

## 2019-01-07 PROCEDURE — 99213 OFFICE O/P EST LOW 20 MIN: CPT | Performed by: FAMILY MEDICINE

## 2019-01-07 RX ORDER — PREDNISONE 20 MG/1
20 TABLET ORAL DAILY
Qty: 2 TABLET | Refills: 0 | Status: SHIPPED | OUTPATIENT
Start: 2019-01-07 | End: 2019-01-09

## 2019-01-07 NOTE — PATIENT INSTRUCTIONS
Take prednisone 20mg once a day for 2 days    Continue the ibuprofen as needed every 4-6 hours for pain    Consider honey remedies or lozenges/sprays to help with the sore throat      --    Patient to follow up with Primary Care provider regarding elevated blood pressure.

## 2019-01-07 NOTE — PROGRESS NOTES
Subjective:   Meggan Jones is a 68 year old female who presents for   Chief Complaint   Patient presents with     Pharyngitis     Sore Throat and Swollen Glands-Hurts to Talk and Swallow      Cough     Chest Heaviness-Feels Congestion-Very Fatigued      Derm Problem     Butterly shapped Red Rash      Can open mouth wide and she does still have her tonsils. When she swallows most of the discomfort is more on the left side at mid-throat.   Hearing has been at her baseline.   She has on/off subjective fevers. Has been taking ibuprofen every 4 hours. Her  states her face gets flushed intermittently.     Not coughing heavily but intermittently does thinking from stagnant saliva in her throat area. She does not have much congestion or runny nose. Has noticed increased pressure in your area.     Her total duration of illness has been 2 days but extends to about 10 days ago.     She takes her BP meds as prescribed.     Patient Active Problem List    Diagnosis Date Noted     Morbid obesity (H) 11/01/2017     Priority: Medium     Hyperlipidemia LDL goal <160 10/12/2016     Priority: Medium     Menopausal state 08/19/2015     Priority: Medium     Off HRT in 2015       Advanced directives, counseling/discussion 08/18/2015     Priority: Medium     Advance Care Planning 8/18/2015: ACP Review and Resources Provided:  Reviewed chart for advance care plan.  Meggan Jones has no plan or code status on file. Discussed available resources and provided with information. Confirmed code status reflects current choices pending further ACP discussions.  Added by Jenni Cruz RN             Hypertriglyceridemia 05/29/2013     Priority: Medium     Osteopenia 01/02/2012     Priority: Medium     CARDIOVASCULAR SCREENING; LDL GOAL LESS THAN 130 10/31/2010     Priority: Medium     Dry eyes 07/26/2010     Priority: Medium     Essential hypertension, benign      Priority: Medium     Obesity 12/26/2006     Priority: Medium      Problem list name updated by automated process. Provider to review       Allergic rhinitis 06/09/2004     Priority: Medium     Problem list name updated by automated process. Provider to review       COMMUNIC DIS CONTACT WC  DOI 12/3/01 12/06/2001     Priority: Medium     Problem list name updated by automated process. Provider to review and confirm       Family history of hypothyroidism 08/19/2015     Priority: Low       Current Outpatient Medications   Medication     ASPIRIN 81 MG OR TABS     atorvastatin (LIPITOR) 20 MG tablet     CALCIUM + D 600-200 MG-UNIT PO TABS     CENTRUM SILVER OR TABS     FISH OIL CONCENTRATE OR CAPS     IBUPROFEN PO     lisinopril-hydrochlorothiazide (PRINZIDE/ZESTORETIC) 20-25 MG per tablet     polyethylene glycol 0.4%- propylene glycol 0.3% (SYSTANE ULTRA) 0.4-0.3 % SOLN ophthalmic solution     predniSONE (DELTASONE) 20 MG tablet     UNABLE TO FIND     vitamin E 400 UNIT capsule     No current facility-administered medications for this visit.      ROS:  As above per HPI    Objective:   /64 (BP Location: Right arm, Patient Position: Chair, Cuff Size: Adult Large)   Pulse 102   Temp 98.8  F (37.1  C) (Oral)   Resp 20   Wt 95.8 kg (211 lb 1.6 oz)   LMP 01/26/2010   SpO2 99%   BMI 39.22 kg/m  , Body mass index is 39.22 kg/m .  Gen:  NAD, well-nourished, sitting in chair comfortably  HEENT: EOMI, sclera anicteric, Head normocephalic, ; nares patent; moist mucous membranes, normal oropharynx, no trismus  Neck: trachea midline, no thyromegaly, slight tenderness underneath left mandible/parotid gland region, no obvious neck swelling  CV:  Hemodynamically stable, RRR  Pulm:  no increased work of breathing , CTAB, no wheezes/rales/rhonchi   Extrem: no cyanosis, edema or clubbing  Skin: no obvious rashes or abnormalities  Psych: Euthymic, linear thoughts, normal rate of speech      Assessment & Plan:   Meggan Jones, 68 year old female who presents with:  Viral pharyngitis  Without  fevers and normal vital signs here. No signs of peritonsillar abscess on my exam. Normal heart/lung exam - will treat with prednisone and recommend ibuprofen. Follow-up within a week if seeing no improvement in symptoms.   - predniSONE (DELTASONE) 20 MG tablet  Dispense: 2 tablet; Refill: 0    Essential hypertension, benign  Elevated for her baseline but would attribute this to her current illness. Adherent to medications - no adjustments made at this time.       Refugio Camacho MD   [place] [facility]    The use of Dragon/Galavantier dictation services may have been used to construct the content in this note; any grammatical or spelling errors are non-intentional. Please contact the author of this note directly if you are in need of any clarification.

## 2019-10-14 DIAGNOSIS — E78.5 HYPERLIPIDEMIA LDL GOAL <160: ICD-10-CM

## 2019-10-14 DIAGNOSIS — I10 ESSENTIAL HYPERTENSION, BENIGN: ICD-10-CM

## 2019-10-14 NOTE — TELEPHONE ENCOUNTER
Pending Prescriptions:                       Disp   Refills    lisinopril-hydrochlorothiazide (PRINZIDE/*90 tab*3            Sig: Take 1 tablet by mouth daily    atorvastatin (LIPITOR) 20 MG tablet       90 tab*3            Sig: Take 1 tablet (20 mg) by mouth daily    Reason for call:  Other   Patient called regarding (reason for call): prescription    Additional comments: Pt called stating she will be out of medication in November. PCP will no longer be practicing. Pt has an appt set up in December for physical and med check. She will need a month yaneth refill until this appt. Please contact pt to advise if there are questions/concerns.    Phone number to reach patient:  Cell number on file:    Telephone Information:   Mobile [phone]       Best Time:  Any    Can we leave a detailed message on this number?  Not Applicable

## 2019-10-15 RX ORDER — ATORVASTATIN CALCIUM 20 MG/1
20 TABLET, FILM COATED ORAL DAILY
Qty: 60 TABLET | Refills: 0 | Status: SHIPPED | OUTPATIENT
Start: 2019-10-15 | End: 2019-12-12

## 2019-10-15 RX ORDER — LISINOPRIL AND HYDROCHLOROTHIAZIDE 20; 25 MG/1; MG/1
1 TABLET ORAL DAILY
Qty: 60 TABLET | Refills: 0 | Status: SHIPPED | OUTPATIENT
Start: 2019-10-15 | End: 2019-12-12

## 2019-10-15 NOTE — TELEPHONE ENCOUNTER
"Requested Prescriptions   Pending Prescriptions Disp Refills     lisinopril-hydrochlorothiazide (PRINZIDE/ZESTORETIC) 20-25 MG tablet 30 tablet 0     Sig: Take 1 tablet by mouth daily       Diuretics (Including Combos) Protocol Failed - 10/14/2019  8:51 AM        Failed - Blood pressure under 140/90 in past 12 months     BP Readings from Last 3 Encounters:   01/07/19 158/64   11/13/18 122/68   05/31/18 134/68                 Passed - Recent (12 mo) or future (30 days) visit within the authorizing provider's specialty     Patient has had an office visit with the authorizing provider or a provider within the authorizing providers department within the previous 12 mos or has a future within next 30 days. See \"Patient Info\" tab in inbasket, or \"Choose Columns\" in Meds & Orders section of the refill encounter.              Passed - Medication is active on med list        Passed - Patient is age 18 or older        Passed - No active pregancy on record        Passed - Normal serum creatinine on file in past 12 months     Recent Labs   Lab Test 11/20/18  0810   CR 0.79              Passed - Normal serum potassium on file in past 12 months     Recent Labs   Lab Test 11/20/18  0810   POTASSIUM 4.0                    Passed - Normal serum sodium on file in past 12 months     Recent Labs   Lab Test 11/20/18  0810                 Passed - No positive pregnancy test in past 12 months        atorvastatin (LIPITOR) 20 MG tablet 30 tablet 0     Sig: Take 1 tablet (20 mg) by mouth daily       Statins Protocol Passed - 10/15/2019 12:39 PM        Passed - LDL on file in past 12 months     Recent Labs   Lab Test 11/20/18  0810   LDL 59             Passed - No abnormal creatine kinase in past 12 months     No lab results found.             Passed - Recent (12 mo) or future (30 days) visit within the authorizing provider's specialty     Patient has had an office visit with the authorizing provider or a provider within the " "authorizing providers department within the previous 12 mos or has a future within next 30 days. See \"Patient Info\" tab in inbasket, or \"Choose Columns\" in Meds & Orders section of the refill encounter.              Passed - Medication is active on med list        Passed - Patient is age 18 or older        Passed - No active pregnancy on record        Passed - No positive pregnancy test in past 12 months        Next 5 appointments (look out 90 days)    Dec 12, 2019  2:15 PM CST  Adult physical with MITUL Ferraro CNP, EA EXAM ROOM 06  [facility] ([facility] [address] [place] [address]  [address]  [facility] [id number] [phone]        Prescription approved per [facility] [place].      Tino Haines RN, BSN, PHN    "

## 2019-10-29 ENCOUNTER — OFFICE VISIT (OUTPATIENT)
Dept: DERMATOLOGY | Facility: CLINIC | Age: 69
End: 2019-10-29
Payer: COMMERCIAL

## 2019-10-29 VITALS — SYSTOLIC BLOOD PRESSURE: 169 MMHG | DIASTOLIC BLOOD PRESSURE: 81 MMHG | HEART RATE: 83 BPM | OXYGEN SATURATION: 98 %

## 2019-10-29 DIAGNOSIS — L82.0 INFLAMED SEBORRHEIC KERATOSIS: Primary | ICD-10-CM

## 2019-10-29 DIAGNOSIS — D22.9 NEVUS: ICD-10-CM

## 2019-10-29 DIAGNOSIS — L82.1 SEBORRHEIC KERATOSIS: ICD-10-CM

## 2019-10-29 DIAGNOSIS — L81.4 LENTIGO: ICD-10-CM

## 2019-10-29 DIAGNOSIS — D18.01 ANGIOMA OF SKIN: ICD-10-CM

## 2019-10-29 DIAGNOSIS — Z80.8 FAMILY HISTORY OF MELANOMA: ICD-10-CM

## 2019-10-29 PROCEDURE — 17110 DESTRUCTION B9 LES UP TO 14: CPT | Performed by: PHYSICIAN ASSISTANT

## 2019-10-29 PROCEDURE — 99213 OFFICE O/P EST LOW 20 MIN: CPT | Mod: 25 | Performed by: PHYSICIAN ASSISTANT

## 2019-10-29 NOTE — PROGRESS NOTES
HPI:   Chief complaints: Meggan Jones is a 69 year old female who presents for Full skin cancer screening to rule out skin cancer   Last Skin Exam: none      1st Baseline: YES  Personal HX of Skin Cancer: none   Personal HX of Malignant Melanoma: none   Family HX of Skin Cancer / Malignant Melanoma: sister with melanoma and NMSC  Personal HX of Atypical Moles: none  Risk factors: sun exposure  New / Changing lesions: yes, spot on forehead  Social History: has 7 grandchildren; range from 17 to 2 year olds. 3 grandchildren live in [place].   On review of systems, there are no further skin complaints, patient is feeling otherwise well.  See patient intake sheet.  ROS of the following were done and are negative: Constitutional, Eyes, Ears, Nose,   Mouth, Throat, Cardiovascular, Respiratory, GI, Genitourinary, Musculoskeletal,   Psychiatric, Endocrine, Allergic/Immunologic.    This document serves as a record of the services and decisions personally performed and made by Seble Camejo, MS, PA-C. It was created on her behalf by Karen Parada, a trained medical scribe. The creation of this document is based on the provider's statements to the medical scribe.  Karen Parada 2:27 PM October 29, 2019    PHYSICAL EXAM:   BP (!) 169/81   Pulse 83   LMP 01/26/2010   SpO2 98%   Breastfeeding? No   Skin exam performed as follows: Type 2 skin. Mood appropriate  Alert and Oriented X 3. Well developed, well nourished in no distress.  General appearance: Normal  Head including face: Normal  Eyes: conjunctiva and lids: Normal  Mouth: Lips, teeth, gums: Normal  Neck: Normal  Chest-breast/axillae: Normal  Back: Normal  Spleen and liver: Normal  Cardiovascular: Exam of peripheral vascular system by observation for swelling, varicosities, edema: Normal  Genitalia: groin, buttocks: Normal  Extremities: digits/nails (clubbing): Normal  Eccrine and Apocrine glands: Normal  Right upper extremity: Normal  Left  upper extremity: Normal  Right lower extremity: Normal  Left lower extremity: Normal  Skin: Scalp and body hair: See below    Pt deferred exam of breasts, groin, buttocks: No    Other physical findings:  1. Multiple pigmented macules on extremities and trunk  2. Multiple pigmented macules on face, trunk and extremities  3. Multiple vascular papules on trunk, arms and legs  4. Multiple scattered keratotic plaques  5. Inflamed keratotic plaque on left lateral forehead x 1 and frontal scalp x 1       Except as noted above, no other signs of skin cancer or melanoma.     ASSESSMENT/PLAN:   Benign Full skin cancer screening today.     Patient with history of none  Advised on monthly self exams and 1 year  Patient Education: Appropriate brochures given.    1. Multiple benign appearing nevi on arms, legs and trunk. Discussed ABCDEs of melanoma and sunscreen.   2. Multiple lentigos on arms, legs and trunk. Advised benign, no treatment needed.  3. Multiple scattered angiomas. Advised benign, no treatment needed.   4. Seborrheic keratosis on arms, legs and trunk. Advised benign, no treatment needed.  5. Inflamed seborrheic keratosis on left lateral forehead x 1 and frontal scalp x 1. As physically tender cryosurgery performed. Advised on post op care.   6. Meggan to follow up with Primary Care provider regarding elevated blood pressure.        Follow-up: yearly FSE/PRN sooner    1.) Patient was asked about new and changing moles. YES  2.) Patient received a complete physical skin examination: YES  3.) Patient was counseled to perform a monthly self skin examination: YES  Scribed By: Karen Parada, Medical Scribe    The information in this document, created by the medical scribe for me, accurately reflects the services I personally performed and the decisions made by me. I have reviewed and approved this document for accuracy prior to leaving the patient care area.  October 29, 2019 2:34 PM    Seble Camejo MS,  SHANNON

## 2019-10-29 NOTE — LETTER
10/29/2019         RE: Meggan Jones  [address] [address]        Dear Colleague,    Thank you for referring your patient, Meggan Jones, to the [facility]. Please see a copy of my visit note below.    HPI:   Chief complaints: Meggan Jones is a 69 year old female who presents for Full skin cancer screening to rule out skin cancer   Last Skin Exam: none      1st Baseline: YES  Personal HX of Skin Cancer: none   Personal HX of Malignant Melanoma: none   Family HX of Skin Cancer / Malignant Melanoma: sister with melanoma and NMSC  Personal HX of Atypical Moles: none  Risk factors: sun exposure  New / Changing lesions: yes, spot on forehead  Social History: has 7 grandchildren; range from 17 to 2 year olds. 3 grandchildren live in [place].   On review of systems, there are no further skin complaints, patient is feeling otherwise well.  See patient intake sheet.  ROS of the following were done and are negative: Constitutional, Eyes, Ears, Nose,   Mouth, Throat, Cardiovascular, Respiratory, GI, Genitourinary, Musculoskeletal,   Psychiatric, Endocrine, Allergic/Immunologic.    This document serves as a record of the services and decisions personally performed and made by Seble Camejo, MS, PA-C. It was created on her behalf by Karen Parada, a trained medical scribe. The creation of this document is based on the provider's statements to the medical scribe.  Karen Parada 2:27 PM October 29, 2019    PHYSICAL EXAM:   BP (!) 169/81   Pulse 83   LMP 01/26/2010   SpO2 98%   Breastfeeding? No   Skin exam performed as follows: Type 2 skin. Mood appropriate  Alert and Oriented X 3. Well developed, well nourished in no distress.  General appearance: Normal  Head including face: Normal  Eyes: conjunctiva and lids: Normal  Mouth: Lips, teeth, gums: Normal  Neck: Normal  Chest-breast/axillae: Normal  Back: Normal  Spleen and liver:  Normal  Cardiovascular: Exam of peripheral vascular system by observation for swelling, varicosities, edema: Normal  Genitalia: groin, buttocks: Normal  Extremities: digits/nails (clubbing): Normal  Eccrine and Apocrine glands: Normal  Right upper extremity: Normal  Left upper extremity: Normal  Right lower extremity: Normal  Left lower extremity: Normal  Skin: Scalp and body hair: See below    Pt deferred exam of breasts, groin, buttocks: No    Other physical findings:  1. Multiple pigmented macules on extremities and trunk  2. Multiple pigmented macules on face, trunk and extremities  3. Multiple vascular papules on trunk, arms and legs  4. Multiple scattered keratotic plaques  5. Inflamed keratotic plaque on left lateral forehead x 1 and frontal scalp x 1       Except as noted above, no other signs of skin cancer or melanoma.     ASSESSMENT/PLAN:   Benign Full skin cancer screening today.     Patient with history of none  Advised on monthly self exams and 1 year  Patient Education: Appropriate brochures given.    1. Multiple benign appearing nevi on arms, legs and trunk. Discussed ABCDEs of melanoma and sunscreen.   2. Multiple lentigos on arms, legs and trunk. Advised benign, no treatment needed.  3. Multiple scattered angiomas. Advised benign, no treatment needed.   4. Seborrheic keratosis on arms, legs and trunk. Advised benign, no treatment needed.  5. Inflamed seborrheic keratosis on left lateral forehead x 1 and frontal scalp x 1. As physically tender cryosurgery performed. Advised on post op care.   6. Meggan to follow up with Primary Care provider regarding elevated blood pressure.        Follow-up: yearly FSE/PRN sooner    1.) Patient was asked about new and changing moles. YES  2.) Patient received a complete physical skin examination: YES  3.) Patient was counseled to perform a monthly self skin examination: YES  Scribed By: Karen Parada, Medical Scribe    The information in this document,  created by the medical scribe for me, accurately reflects the services I personally performed and the decisions made by me. I have reviewed and approved this document for accuracy prior to leaving the patient care area.  October 29, 2019 2:34 PM    Seble Camejo MS, PAAriC        Again, thank you for allowing me to participate in the care of your patient.        Sincerely,        Seble Cameoj PA-C

## 2019-12-10 NOTE — PROGRESS NOTES
Pre-Visit Planning     Future Appointments   Date Time Provider Department Center   12/12/2019  2:15 PM Stephani Cruz, APRN CNP [facility]     Arrival Time for this Appointment:  2:15 PM   Appointment Notes for this encounter:   Physical- med check, may want to est care    Questionnaires Reviewed/Assigned  Additional questionnaires assigned - PHQ2     Patient preferred phone number: [phone]    Unable to reach. Left voicemail. Advised patient to call clinic back at [phone] with any questions or concerns.    Quentin Newton, EMT at 12:56 PM on December 10, 2019   Clinic Health Guide   [phone]

## 2019-12-10 NOTE — PROGRESS NOTES
"SUBJECTIVE:   Meggan Jones is a 69 year old female who presents for Preventive Visit.      Are you in the first 12 months of your Medicare coverage?  No    Healthy Habits:     In general, how would you rate your overall health?  Good    Frequency of exercise:  2-3 days/week    Duration of exercise:  15-30 minutes    Do you usually eat at least 4 servings of fruit and vegetables a day, include whole grains    & fiber and avoid regularly eating high fat or \"junk\" foods?  Yes    Taking medications regularly:  Yes    Medication side effects:  Muscle aches    Ability to successfully perform activities of daily living:  No assistance needed    Home Safety:  No safety concerns identified    Hearing Impairment:  Difficulty following a conversation in a noisy restaurant or crowded room, feel that people are mumbling or not speaking clearly, need to ask people to speak up or repeat themselves, find that men's voices are easier to understand than woman's, difficulty understanding soft or whispered speech and difficulty understanding speech on the telephone    In the past 6 months, have you been bothered by leaking of urine?  No    In general, how would you rate your overall mental or emotional health?  Excellent      PHQ-2 Total Score: 0    Additional concerns today:  No    Do you feel safe in your environment? Yes    Have you ever done Advance Care Planning? (For example, a Health Directive, POLST, or a discussion with a medical provider or your loved ones about your wishes): No, advance care planning information given to patient to review.  Patient plans to discuss their wishes with loved ones or provider.        Fall risk  Fallen 2 or more times in the past year?: No  Any fall with injury in the past year?: No    Cognitive Screening   1) Repeat 3 items (Leader, Season, Table)    2) Clock draw: NORMAL  3) 3 item recall: Recalls 3 objects  Results: 3 items recalled: COGNITIVE IMPAIRMENT LESS LIKELY    Mini-CogTM Copyright S " Saúl. Licensed by the author for use in [facility]; reprinted with permission (julito@[email or web address]). All rights reserved.      Do you have sleep apnea, excessive snoring or daytime drowsiness?: no    Reviewed and updated as needed this visit by clinical staff  Tobacco  Allergies  Med Hx  Surg Hx  Fam Hx  Soc Hx        Wt Readings from Last 4 Encounters:   12/12/19 101.2 kg (223 lb)   01/07/19 95.8 kg (211 lb 1.6 oz)   11/13/18 98 kg (216 lb)   05/31/18 97.3 kg (214 lb 6.4 oz)     She has gained weight since last office visit and she attributes this to arthritis in shoulders and hands and feet.     Hypertension ROS: taking medications as instructed,no medication side effects noted,no TIA's,no chest pain on exertion,no dyspnea on exertion,no swelling of ankles.   New concerns: always higher in office. She does have cuff, hasn't been checking at home for a while.   BP Readings from Last 3 Encounters:   12/12/19 (!) 152/68   10/29/19 (!) 169/81   01/07/19 158/64     History of hyperlipidemia, on statin.   .  Lab Results   Component Value Date    CHOL 120 11/20/2018     Lab Results   Component Value Date    HDL 39 11/20/2018     Lab Results   Component Value Date    LDL 59 11/20/2018     Lab Results   Component Value Date    TRIG 110 11/20/2018     Lab Results   Component Value Date    CHOLHDLRATIO 4.9 08/18/2015         Reviewed and updated as needed this visit by Provider        Social History     Tobacco Use     Smoking status: Never Smoker     Smokeless tobacco: Never Used   Substance Use Topics     Alcohol use: Yes     Frequency: Monthly or less     Drinks per session: 1 or 2     Binge frequency: Never     Comment: rarely         Alcohol Use 12/12/2019   Prescreen: >3 drinks/day or >7 drinks/week? No   Prescreen: >3 drinks/day or >7 drinks/week? -           -------------------------------------    Current providers sharing in care for this patient include:   Patient Care Team:  Adrianna Aparicio,  MD as PCP - General (Pediatrics)  Adrianna Aparicio MD as Assigned PCP    The following health maintenance items are reviewed in Epic and correct as of today:  Health Maintenance   Topic Date Due     ANNUAL REVIEW OF HM ORDERS  1950     ZOSTER IMMUNIZATION (2 of 3) 07/10/2013     DTAP/TDAP/TD IMMUNIZATION (2 - Td) 11/19/2017     PHQ-2  01/01/2019     INFLUENZA VACCINE (1) 09/01/2019     FALL RISK ASSESSMENT  11/13/2019     BMP  11/20/2019     CMP  11/20/2019     LIPID  11/20/2019     MICROALBUMIN  11/20/2019     MEDICARE ANNUAL WELLNESS VISIT  11/13/2019     MAMMO SCREENING  12/06/2019     COLONOSCOPY  06/30/2020     ADVANCE CARE PLANNING  08/18/2020     DEXA  10/15/2020     HEPATITIS C SCREENING  Completed     PNEUMOCOCCAL IMMUNIZATION 65+ LOW/MEDIUM RISK  Completed     IPV IMMUNIZATION  Aged Out     MENINGITIS IMMUNIZATION  Aged Out     Lab work is in process    Review of Systems   Constitutional: Negative for chills and fever.   HENT: Positive for hearing loss. Negative for congestion, ear pain and sore throat.    Eyes: Negative for pain and visual disturbance.   Respiratory: Negative for cough and shortness of breath.    Cardiovascular: Negative for chest pain, palpitations and peripheral edema.   Gastrointestinal: Positive for diarrhea. Negative for abdominal pain, constipation, heartburn, hematochezia and nausea.   Breasts:  Negative for tenderness, breast mass and discharge.   Genitourinary: Positive for frequency. Negative for dysuria, genital sores, hematuria, pelvic pain, urgency, vaginal bleeding and vaginal discharge.   Musculoskeletal: Positive for arthralgias and joint swelling. Negative for myalgias.   Skin: Negative for rash.   Neurological: Negative for dizziness, weakness, headaches and paresthesias.   Psychiatric/Behavioral: Negative for mood changes. The patient is not nervous/anxious.          OBJECTIVE:   BP (!) 152/68 (Cuff Size: Adult Regular)   Pulse 87   Temp 98  F (36.7  C)  "(Tympanic)   Ht 1.562 m (5' 1.5\")   Wt 101.2 kg (223 lb)   LMP 01/26/2010   SpO2 97%   BMI 41.45 kg/m   Estimated body mass index is 41.45 kg/m  as calculated from the following:    Height as of this encounter: 1.562 m (5' 1.5\").    Weight as of this encounter: 101.2 kg (223 lb).  Physical Exam  GENERAL: healthy, alert and no distress  EYES: Eyes grossly normal to inspection, PERRL and conjunctivae and sclerae normal  HENT: ear canals and TM's normal, nose and mouth without ulcers or lesions  NECK: no adenopathy, no asymmetry, masses, or scars and thyroid normal to palpation  RESP: lungs clear to auscultation - no rales, rhonchi or wheezes  CV: regular rate and rhythm, normal S1 S2, no S3 or S4, no murmur, click or rub, no peripheral edema and peripheral pulses strong  MS: no gross musculoskeletal defects noted, no edema  SKIN: no suspicious lesions or rashes  PSYCH: mentation appears normal, affect normal/bright      ASSESSMENT / PLAN:   1. Encounter for well adult exam without abnormal findings    - MA SCREENING DIGITAL BILAT - Future  (s+30); Future    2. Hyperlipidemia LDL goal <160    - Lipid panel reflex to direct LDL Fasting; Future  - atorvastatin (LIPITOR) 20 MG tablet; Take 1 tablet (20 mg) by mouth daily  Dispense: 90 tablet; Refill: prn    3. Hypertriglyceridemia  Stable on current regime. We disucssed the role of weight, diet, exercise.   - Lipid panel reflex to direct LDL Fasting; Future    4. Essential hypertension, benign  Not well controlled. She does have BP cuff at home and encouraged to start checking at home. Will increase dose of lisinopril and follow up with nurse only BP check.   - COMPREHENSIVE METABOLIC PANEL; Future  - Albumin Random Urine Quantitative with Creat Ratio; Future  - lisinopril (PRINIVIL/ZESTRIL) 20 MG tablet; Take 1 tablet (20 mg) by mouth daily  Dispense: 90 tablet; Refill: 0  - lisinopril-hydrochlorothiazide (PRINZIDE/ZESTORETIC) 20-25 MG tablet; Take 1 tablet by mouth " "daily  Dispense: 90 tablet; Refill: 1    5. Morbid obesity (H)  She has gained 10# since last office visit, she attributes some of this due to increase din osteoarthritic pain and will refer to ortho to discuss potential joint injections. She is open to seeing wt [facility]. She is motivated to lose weight before grandaughter's event in [place] in may. Will consult with [facility] clinic.   - BARIATRIC ADULT REFERRAL    6. Primary osteoarthritis of both shoulders    - ORTHO  REFERRAL    COUNSELING:  Reviewed preventive health counseling, as reflected in patient instructions  Special attention given to:       Regular exercise       Healthy diet/nutrition       Vision screening       Hearing screening       Dental care    Estimated body mass index is 41.45 kg/m  as calculated from the following:    Height as of this encounter: 1.562 m (5' 1.5\").    Weight as of this encounter: 101.2 kg (223 lb).    Weight management plan: Patient referred to endocrine and/or weight management specialty     reports that she has never smoked. She has never used smokeless tobacco.      Appropriate preventive services were discussed with this patient, including applicable screening as appropriate for cardiovascular disease, diabetes, osteopenia/osteoporosis, and glaucoma.  As appropriate for age/gender, discussed screening for colorectal cancer, prostate cancer, breast cancer, and cervical cancer. Checklist reviewing preventive services available has been given to the patient.    Reviewed patients plan of care and provided an AVS. The Intermediate Care Plan ( asthma action plan, low back pain action plan, and migraine action plan) for Meggan meets the Care Plan requirement. This Care Plan has been established and reviewed with the Patient.    Counseling Resources:  ATP IV Guidelines  Pooled Cohorts Equation Calculator  Breast Cancer Risk Calculator  FRAX Risk Assessment  ICSI Preventive Guidelines  Dietary Guidelines for Americans, " 2010  USDA's MyPlate  ASA Prophylaxis  Lung CA Screening    Stephani Collado, MITUL [facility]    Identified Health Risks:

## 2019-12-12 ENCOUNTER — OFFICE VISIT (OUTPATIENT)
Dept: PEDIATRICS | Facility: CLINIC | Age: 69
End: 2019-12-12
Payer: COMMERCIAL

## 2019-12-12 VITALS
DIASTOLIC BLOOD PRESSURE: 60 MMHG | WEIGHT: 223 LBS | SYSTOLIC BLOOD PRESSURE: 140 MMHG | TEMPERATURE: 98 F | HEIGHT: 62 IN | HEART RATE: 87 BPM | OXYGEN SATURATION: 97 % | BODY MASS INDEX: 41.04 KG/M2

## 2019-12-12 DIAGNOSIS — E66.01 MORBID OBESITY (H): ICD-10-CM

## 2019-12-12 DIAGNOSIS — M19.012 PRIMARY OSTEOARTHRITIS OF BOTH SHOULDERS: ICD-10-CM

## 2019-12-12 DIAGNOSIS — M19.011 PRIMARY OSTEOARTHRITIS OF BOTH SHOULDERS: ICD-10-CM

## 2019-12-12 DIAGNOSIS — E78.5 HYPERLIPIDEMIA LDL GOAL <160: ICD-10-CM

## 2019-12-12 DIAGNOSIS — I10 ESSENTIAL HYPERTENSION, BENIGN: ICD-10-CM

## 2019-12-12 DIAGNOSIS — E78.1 HYPERTRIGLYCERIDEMIA: ICD-10-CM

## 2019-12-12 DIAGNOSIS — Z00.00 ENCOUNTER FOR WELL ADULT EXAM WITHOUT ABNORMAL FINDINGS: Primary | ICD-10-CM

## 2019-12-12 PROCEDURE — 99213 OFFICE O/P EST LOW 20 MIN: CPT | Mod: 25 | Performed by: NURSE PRACTITIONER

## 2019-12-12 PROCEDURE — 90715 TDAP VACCINE 7 YRS/> IM: CPT | Performed by: NURSE PRACTITIONER

## 2019-12-12 PROCEDURE — 90471 IMMUNIZATION ADMIN: CPT | Performed by: NURSE PRACTITIONER

## 2019-12-12 PROCEDURE — 99397 PER PM REEVAL EST PAT 65+ YR: CPT | Mod: 25 | Performed by: NURSE PRACTITIONER

## 2019-12-12 RX ORDER — LISINOPRIL 20 MG/1
20 TABLET ORAL DAILY
Qty: 90 TABLET | Refills: 0 | Status: SHIPPED | OUTPATIENT
Start: 2019-12-12 | End: 2021-01-22

## 2019-12-12 RX ORDER — ATORVASTATIN CALCIUM 20 MG/1
20 TABLET, FILM COATED ORAL DAILY
Qty: 90 TABLET | Status: SHIPPED | OUTPATIENT
Start: 2019-12-12 | End: 2021-01-14

## 2019-12-12 RX ORDER — LISINOPRIL AND HYDROCHLOROTHIAZIDE 20; 25 MG/1; MG/1
1 TABLET ORAL DAILY
Qty: 90 TABLET | Refills: 1 | Status: SHIPPED | OUTPATIENT
Start: 2019-12-12 | End: 2020-05-27

## 2019-12-12 SDOH — ECONOMIC STABILITY: TRANSPORTATION INSECURITY
IN THE PAST 12 MONTHS, HAS LACK OF TRANSPORTATION KEPT YOU FROM MEETINGS, WORK, OR FROM GETTING THINGS NEEDED FOR DAILY LIVING?: NO

## 2019-12-12 SDOH — SOCIAL STABILITY: SOCIAL NETWORK
IN A TYPICAL WEEK, HOW MANY TIMES DO YOU TALK ON THE PHONE WITH FAMILY, FRIENDS, OR NEIGHBORS?: MORE THAN THREE TIMES A WEEK

## 2019-12-12 SDOH — HEALTH STABILITY: MENTAL HEALTH
STRESS IS WHEN SOMEONE FEELS TENSE, NERVOUS, ANXIOUS, OR CAN'T SLEEP AT NIGHT BECAUSE THEIR MIND IS TROUBLED. HOW STRESSED ARE YOU?: ONLY A LITTLE

## 2019-12-12 SDOH — ECONOMIC STABILITY: INCOME INSECURITY: HOW HARD IS IT FOR YOU TO PAY FOR THE VERY BASICS LIKE FOOD, HOUSING, MEDICAL CARE, AND HEATING?: NOT VERY HARD

## 2019-12-12 SDOH — SOCIAL STABILITY: SOCIAL NETWORK: HOW OFTEN DO YOU GET TOGETHER WITH FRIENDS OR RELATIVES?: TWICE A WEEK

## 2019-12-12 SDOH — ECONOMIC STABILITY: TRANSPORTATION INSECURITY
IN THE PAST 12 MONTHS, HAS THE LACK OF TRANSPORTATION KEPT YOU FROM MEDICAL APPOINTMENTS OR FROM GETTING MEDICATIONS?: NO

## 2019-12-12 SDOH — HEALTH STABILITY: MENTAL HEALTH: HOW OFTEN DO YOU HAVE 6 OR MORE DRINKS ON ONE OCCASION?: NEVER

## 2019-12-12 SDOH — HEALTH STABILITY: MENTAL HEALTH: HOW OFTEN DO YOU HAVE A DRINK CONTAINING ALCOHOL?: MONTHLY OR LESS

## 2019-12-12 SDOH — SOCIAL STABILITY: SOCIAL NETWORK
DO YOU BELONG TO ANY CLUBS OR ORGANIZATIONS SUCH AS CHURCH GROUPS UNIONS, FRATERNAL OR ATHLETIC GROUPS, OR SCHOOL GROUPS?: YES

## 2019-12-12 SDOH — ECONOMIC STABILITY: FOOD INSECURITY: WITHIN THE PAST 12 MONTHS, YOU WORRIED THAT YOUR FOOD WOULD RUN OUT BEFORE YOU GOT MONEY TO BUY MORE.: NEVER TRUE

## 2019-12-12 SDOH — HEALTH STABILITY: PHYSICAL HEALTH: ON AVERAGE, HOW MANY DAYS PER WEEK DO YOU ENGAGE IN MODERATE TO STRENUOUS EXERCISE (LIKE A BRISK WALK)?: 2 DAYS

## 2019-12-12 SDOH — SOCIAL STABILITY: SOCIAL NETWORK: HOW OFTEN DO YOU ATTEND CHURCH OR RELIGIOUS SERVICES?: MORE THAN 4 TIMES PER YEAR

## 2019-12-12 SDOH — ECONOMIC STABILITY: FOOD INSECURITY: WITHIN THE PAST 12 MONTHS, THE FOOD YOU BOUGHT JUST DIDN'T LAST AND YOU DIDN'T HAVE MONEY TO GET MORE.: NEVER TRUE

## 2019-12-12 SDOH — HEALTH STABILITY: PHYSICAL HEALTH: ON AVERAGE, HOW MANY MINUTES DO YOU ENGAGE IN EXERCISE AT THIS LEVEL?: 30 MIN

## 2019-12-12 SDOH — SOCIAL STABILITY: SOCIAL NETWORK: ARE YOU MARRIED, WIDOWED, DIVORCED, SEPARATED, NEVER MARRIED, OR LIVING WITH A PARTNER?: MARRIED

## 2019-12-12 SDOH — HEALTH STABILITY: MENTAL HEALTH: HOW MANY STANDARD DRINKS CONTAINING ALCOHOL DO YOU HAVE ON A TYPICAL DAY?: 1 OR 2

## 2019-12-12 SDOH — SOCIAL STABILITY: SOCIAL NETWORK: HOW OFTEN DO YOU ATTENT MEETINGS OF THE CLUB OR ORGANIZATION YOU BELONG TO?: MORE THAN 4 TIMES PER YEAR

## 2019-12-12 ASSESSMENT — ENCOUNTER SYMPTOMS
CONSTIPATION: 0
HEMATURIA: 0
NERVOUS/ANXIOUS: 0
NAUSEA: 0
JOINT SWELLING: 1
DIARRHEA: 1
COUGH: 0
PARESTHESIAS: 0
BREAST MASS: 0
HEADACHES: 0
HEARTBURN: 0
MYALGIAS: 0
HEMATOCHEZIA: 0
PALPITATIONS: 0
WEAKNESS: 0
SHORTNESS OF BREATH: 0
EYE PAIN: 0
ARTHRALGIAS: 1
FREQUENCY: 1
DIZZINESS: 0
SORE THROAT: 0
CHILLS: 0
ABDOMINAL PAIN: 0
DYSURIA: 0
FEVER: 0

## 2019-12-12 ASSESSMENT — MIFFLIN-ST. JEOR: SCORE: 1481.83

## 2019-12-12 ASSESSMENT — ACTIVITIES OF DAILY LIVING (ADL): CURRENT_FUNCTION: NO ASSISTANCE NEEDED

## 2019-12-12 NOTE — PATIENT INSTRUCTIONS
Increase the lisinopril from 20 mg to 40 mg. Cut salt, check your BPs at home and schedule a nurse only BP check AND fasting labs in a month.     You could try aleve instead of ibuprofen for arthritis pain.     Expect a call to schedule with ortho and weight management clinic.

## 2020-01-09 ENCOUNTER — OFFICE VISIT (OUTPATIENT)
Dept: ORTHOPEDICS | Facility: CLINIC | Age: 70
End: 2020-01-09
Payer: COMMERCIAL

## 2020-01-09 ENCOUNTER — ANCILLARY PROCEDURE (OUTPATIENT)
Dept: GENERAL RADIOLOGY | Facility: CLINIC | Age: 70
End: 2020-01-09
Attending: FAMILY MEDICINE
Payer: COMMERCIAL

## 2020-01-09 VITALS
WEIGHT: 223 LBS | HEIGHT: 62 IN | SYSTOLIC BLOOD PRESSURE: 146 MMHG | BODY MASS INDEX: 41.04 KG/M2 | DIASTOLIC BLOOD PRESSURE: 78 MMHG

## 2020-01-09 DIAGNOSIS — M25.512 CHRONIC PAIN OF BOTH SHOULDERS: Primary | ICD-10-CM

## 2020-01-09 DIAGNOSIS — M25.511 CHRONIC PAIN OF BOTH SHOULDERS: ICD-10-CM

## 2020-01-09 DIAGNOSIS — G89.29 CHRONIC PAIN OF BOTH SHOULDERS: Primary | ICD-10-CM

## 2020-01-09 DIAGNOSIS — G89.29 CHRONIC PAIN OF BOTH SHOULDERS: ICD-10-CM

## 2020-01-09 DIAGNOSIS — M25.512 CHRONIC PAIN OF BOTH SHOULDERS: ICD-10-CM

## 2020-01-09 DIAGNOSIS — R29.898 SHOULDER WEAKNESS: ICD-10-CM

## 2020-01-09 DIAGNOSIS — M25.511 CHRONIC PAIN OF BOTH SHOULDERS: Primary | ICD-10-CM

## 2020-01-09 DIAGNOSIS — M19.012 ARTHRITIS OF BOTH ACROMIOCLAVICULAR JOINTS: ICD-10-CM

## 2020-01-09 DIAGNOSIS — M67.911 DYSFUNCTION OF RIGHT ROTATOR CUFF: ICD-10-CM

## 2020-01-09 DIAGNOSIS — M19.011 ARTHRITIS OF BOTH ACROMIOCLAVICULAR JOINTS: ICD-10-CM

## 2020-01-09 PROCEDURE — 99204 OFFICE O/P NEW MOD 45 MIN: CPT | Performed by: FAMILY MEDICINE

## 2020-01-09 PROCEDURE — 73030 X-RAY EXAM OF SHOULDER: CPT | Mod: LT

## 2020-01-09 ASSESSMENT — MIFFLIN-ST. JEOR: SCORE: 1481.83

## 2020-01-09 NOTE — PROGRESS NOTES
"ASSESSMENT & PLAN    1. Chronic pain of both shoulders    2. Arthritis of both acromioclavicular joints    3. Dysfunction of right rotator cuff    4. Shoulder weakness      Seen in consultation for chronic bilateral shoulder pain  Reviewed xray - AC joint arthritis, mild glenohumeral arthritis on the right  Given weakness I suuspect rotator cuff tearing on the right  MRI of your right shoulder has been ordered. Schedule with  [place] ([phone]).   Physical therapy: [facility] - [phone]    Follow-up 3-5 days after your MRI in the office to discuss results / next steps.    -----    SUBJECTIVE  Meggan Jones is a/an 69 year old Right handed female who is seen in consultation at the request of  Stephani Cruz C.N.P. for evaluation of bilateral shoulder pain. The patient is seen by themselves.    Onset: 3-4 years(s) ago with pain gradually getting worse. Reports insidious onset without acute precipitating event.  Location of Pain: bilateral anterior shoulders (\"deep\") - right shoulder much worse than left shoulder  Rating of Pain at worst: 7/10  Rating of Pain Currently: 2/10  Worsened by: lifting, reaching behind back  Better with: ibuprofen, heat, massage  Treatments tried: rest/activity avoidance, heat and ibuprofen  Associated symptoms: no distal numbness or tingling; reports catching, decreased strength, decreased ROM, clicking  Orthopedic history: YES - patient reports h/o acute right shoulder dislocation after a skiing accident Date: 30 years ago  Relevant surgical history: NO  Patient Social History: retired    Patient's past medical, surgical, social, and family histories were reviewed today and no pertinent history related to patient's presenting problem.    REVIEW OF SYSTEMS:  10 point ROS is negative other than symptoms noted above in HPI, Past Medical History or as stated below  Constitutional: NEGATIVE for fever, chills, change in weight  Skin: NEGATIVE for worrisome " "rashes, moles or lesions  GI/: NEGATIVE for bowel or bladder changes  Neuro: NEGATIVE for weakness, dizziness or paresthesias    OBJECTIVE:  BP (!) 146/78   Ht 1.562 m (5' 1.5\")   Wt 101.2 kg (223 lb)   LMP 01/26/2010   BMI 41.45 kg/m     General: healthy, alert and in no distress  HEENT: no scleral icterus or conjunctival erythema  Skin: no suspicious lesions or rash. No jaundice.  CV: regular rhythm by palpation  Resp: normal respiratory effort without conversational dyspnea   Psych: normal mood and affect  Gait: normal steady gait with appropriate coordination and balance  Neuro: normal light touch sensory exam of the bilateral upper extremities.    MSK:  BILATERAL SHOULDER  Inspection:    no atrophy  Palpation:    Tender about the AC joint and supraspinatus insertion. Remainder of bony and tendinous landmarks are nontender.  Active Range of Motion:     Abduction 1800, FF 1800,   Strength:    Scapular plane abduction 5/5 (left), 4+/5 (right),  ER 5/5 (left), 4+/5 (right), IR 5/5  Special Tests:    Positive: Durant' and supraspinatus (empty can)    Independent visualization of the below image:  Xry Bilateral Shoulder  Right: Mild glenohumeral spurring/degenerative changes with subacromal flattening/sclerosis. High riding humerus. AC narrowing. No fracture or acute osseous findings.  Left: AC narrowing. Well preserved glenohumeral joint. Patent outlet. No fracture or acute osseous findings.    [place],  [facility]    "

## 2020-01-09 NOTE — LETTER
"    1/9/2020         RE: Meggan Jones  [address] [id number]        Dear Colleague,    Thank you for referring your patient, Meggan Jones, to the [facility]. Please see a copy of my visit note below.    ASSESSMENT & PLAN    1. Chronic pain of both shoulders    2. Arthritis of both acromioclavicular joints    3. Dysfunction of right rotator cuff    4. Shoulder weakness      Seen in consultation for chronic bilateral shoulder pain  Reviewed xray - AC joint arthritis, mild glenohumeral arthritis on the right  Given weakness I suuspect rotator cuff tearing on the right  MRI of your right shoulder has been ordered. Schedule with  [place] ([phone]).   Physical therapy: [facility] - [phone]    Follow-up 3-5 days after your MRI in the office to discuss results / next steps.    -----    SUBJECTIVE  Meggan Jones is a/an 69 year old Right handed female who is seen in consultation at the request of  Stephani Cruz C.N.P. for evaluation of bilateral shoulder pain. The patient is seen by themselves.    Onset: 3-4 years(s) ago with pain gradually getting worse. Reports insidious onset without acute precipitating event.  Location of Pain: bilateral anterior shoulders (\"deep\") - right shoulder much worse than left shoulder  Rating of Pain at worst: 7/10  Rating of Pain Currently: 2/10  Worsened by: lifting, reaching behind back  Better with: ibuprofen, heat, massage  Treatments tried: rest/activity avoidance, heat and ibuprofen  Associated symptoms: no distal numbness or tingling; reports catching, decreased strength, decreased ROM, clicking  Orthopedic history: YES - patient reports h/o acute right shoulder dislocation after a skiing accident Date: 30 years ago  Relevant surgical history: NO  Patient Social History: retired    Patient's past medical, surgical, social, and family histories were reviewed today and no pertinent history related to " "patient's presenting problem.    REVIEW OF SYSTEMS:  10 point ROS is negative other than symptoms noted above in HPI, Past Medical History or as stated below  Constitutional: NEGATIVE for fever, chills, change in weight  Skin: NEGATIVE for worrisome rashes, moles or lesions  GI/: NEGATIVE for bowel or bladder changes  Neuro: NEGATIVE for weakness, dizziness or paresthesias    OBJECTIVE:  BP (!) 146/78   Ht 1.562 m (5' 1.5\")   Wt 101.2 kg (223 lb)   LMP 01/26/2010   BMI 41.45 kg/m      General: healthy, alert and in no distress  HEENT: no scleral icterus or conjunctival erythema  Skin: no suspicious lesions or rash. No jaundice.  CV: regular rhythm by palpation  Resp: normal respiratory effort without conversational dyspnea   Psych: normal mood and affect  Gait: normal steady gait with appropriate coordination and balance  Neuro: normal light touch sensory exam of the bilateral upper extremities.    MSK:  BILATERAL SHOULDER  Inspection:    no atrophy  Palpation:    Tender about the AC joint and supraspinatus insertion. Remainder of bony and tendinous landmarks are nontender.  Active Range of Motion:     Abduction 1800, FF 1800,   Strength:    Scapular plane abduction 5/5 (left), 4+/5 (right),  ER 5/5 (left), 4+/5 (right), IR 5/5  Special Tests:    Positive: Durant' and supraspinatus (empty can)    Independent visualization of the below image:  Xry Bilateral Shoulder  Right: Mild glenohumeral spurring/degenerative changes with subacromal flattening/sclerosis. High riding humerus. AC narrowing. No fracture or acute osseous findings.  Left: AC narrowing. Well preserved glenohumeral joint. Patent outlet. No fracture or acute osseous findings.    Milton Park DO [facility]      Again, thank you for allowing me to participate in the care of your patient.        Sincerely,        Milton Park DO    "

## 2020-01-09 NOTE — PATIENT INSTRUCTIONS
1. Chronic pain of both shoulders    2. Arthritis of both acromioclavicular joints    3. Dysfunction of right rotator cuff    4. Shoulder weakness      Reviewed xray - AC joint arthritis, mild glenohumeral arthritis on the right  Given weakness I suuspect rotator cuff tearing on the right  MRI of your right shoulder has been ordered. Schedule with  [place] ([phone]).   Physical therapy: [facility] - [phone]    Follow-up 3-5 days after your MRI in the office to discuss results / next steps.

## 2020-01-15 ENCOUNTER — ANCILLARY PROCEDURE (OUTPATIENT)
Dept: MAMMOGRAPHY | Facility: CLINIC | Age: 70
End: 2020-01-15
Attending: NURSE PRACTITIONER
Payer: COMMERCIAL

## 2020-01-15 ENCOUNTER — ALLIED HEALTH/NURSE VISIT (OUTPATIENT)
Dept: NURSING | Facility: CLINIC | Age: 70
End: 2020-01-15
Payer: COMMERCIAL

## 2020-01-15 VITALS — SYSTOLIC BLOOD PRESSURE: 126 MMHG | DIASTOLIC BLOOD PRESSURE: 58 MMHG | OXYGEN SATURATION: 98 % | HEART RATE: 82 BPM

## 2020-01-15 DIAGNOSIS — I10 ESSENTIAL HYPERTENSION, BENIGN: ICD-10-CM

## 2020-01-15 DIAGNOSIS — E78.5 HYPERLIPIDEMIA LDL GOAL <160: ICD-10-CM

## 2020-01-15 DIAGNOSIS — E78.1 HYPERTRIGLYCERIDEMIA: ICD-10-CM

## 2020-01-15 DIAGNOSIS — E78.5 HYPERLIPIDEMIA LDL GOAL <160: Primary | ICD-10-CM

## 2020-01-15 DIAGNOSIS — Z00.00 ENCOUNTER FOR WELL ADULT EXAM WITHOUT ABNORMAL FINDINGS: ICD-10-CM

## 2020-01-15 LAB
ALBUMIN SERPL-MCNC: 3.8 G/DL (ref 3.4–5)
ALP SERPL-CCNC: 93 U/L (ref 40–150)
ALT SERPL W P-5'-P-CCNC: 38 U/L (ref 0–50)
ANION GAP SERPL CALCULATED.3IONS-SCNC: 5 MMOL/L (ref 3–14)
AST SERPL W P-5'-P-CCNC: 22 U/L (ref 0–45)
BILIRUB SERPL-MCNC: 0.4 MG/DL (ref 0.2–1.3)
BUN SERPL-MCNC: 21 MG/DL (ref 7–30)
CALCIUM SERPL-MCNC: 9.7 MG/DL (ref 8.5–10.1)
CHLORIDE SERPL-SCNC: 101 MMOL/L (ref 94–109)
CHOLEST SERPL-MCNC: 140 MG/DL
CO2 SERPL-SCNC: 29 MMOL/L (ref 20–32)
CREAT SERPL-MCNC: 0.79 MG/DL (ref 0.52–1.04)
CREAT UR-MCNC: 96 MG/DL
GFR SERPL CREATININE-BSD FRML MDRD: 76 ML/MIN/{1.73_M2}
GLUCOSE SERPL-MCNC: 89 MG/DL (ref 70–99)
HDLC SERPL-MCNC: 43 MG/DL
LDLC SERPL CALC-MCNC: 78 MG/DL
MICROALBUMIN UR-MCNC: 9 MG/L
MICROALBUMIN/CREAT UR: 8.92 MG/G CR (ref 0–25)
NONHDLC SERPL-MCNC: 97 MG/DL
POTASSIUM SERPL-SCNC: 3.9 MMOL/L (ref 3.4–5.3)
PROT SERPL-MCNC: 8.1 G/DL (ref 6.8–8.8)
SODIUM SERPL-SCNC: 135 MMOL/L (ref 133–144)
TRIGL SERPL-MCNC: 95 MG/DL

## 2020-01-15 PROCEDURE — 99207 ZZC NO CHARGE NURSE ONLY: CPT

## 2020-01-15 PROCEDURE — 77067 SCR MAMMO BI INCL CAD: CPT | Mod: TC

## 2020-01-15 PROCEDURE — 36415 COLL VENOUS BLD VENIPUNCTURE: CPT | Performed by: NURSE PRACTITIONER

## 2020-01-15 PROCEDURE — 82043 UR ALBUMIN QUANTITATIVE: CPT | Performed by: NURSE PRACTITIONER

## 2020-01-15 PROCEDURE — 80061 LIPID PANEL: CPT | Performed by: NURSE PRACTITIONER

## 2020-01-15 PROCEDURE — 80053 COMPREHEN METABOLIC PANEL: CPT | Performed by: NURSE PRACTITIONER

## 2020-01-15 NOTE — PROGRESS NOTES
Meggan Jones is a 69 year old patient who comes in today for a Blood Pressure check.  Initial BP:  /58 (BP Location: Right arm, Patient Position: Sitting, Cuff Size: Adult Large)   Pulse 82   LMP 01/26/2010   SpO2 98%      Data Unavailable  Disposition: results routed to provider      Patient states that after taking lisinopril (PRINIVIL/ZESTRIL) 20 MG tablet for the first 2 days she felt very dizzy when she would wake up. Patient decided to cut her lisinopril (PRINIVIL/ZESTRIL) 20 MG tablet in half and that has improved her dizziness.       1/8 134/69  1/9 123/78   1/10 124/70   1/11 126/71  1/12 118/66  1/13 133/70   1/14 137/12    BP taken at Noon.       Tina Jenkins, MARISA

## 2020-01-16 ENCOUNTER — THERAPY VISIT (OUTPATIENT)
Dept: PHYSICAL THERAPY | Facility: CLINIC | Age: 70
End: 2020-01-16
Payer: COMMERCIAL

## 2020-01-16 DIAGNOSIS — G89.29 CHRONIC PAIN OF BOTH SHOULDERS: ICD-10-CM

## 2020-01-16 DIAGNOSIS — M67.911 DYSFUNCTION OF RIGHT ROTATOR CUFF: ICD-10-CM

## 2020-01-16 DIAGNOSIS — M19.012 ARTHRITIS OF BOTH ACROMIOCLAVICULAR JOINTS: ICD-10-CM

## 2020-01-16 DIAGNOSIS — M19.011 ARTHRITIS OF BOTH ACROMIOCLAVICULAR JOINTS: ICD-10-CM

## 2020-01-16 DIAGNOSIS — M25.511 CHRONIC PAIN OF BOTH SHOULDERS: ICD-10-CM

## 2020-01-16 DIAGNOSIS — M25.512 CHRONIC PAIN OF BOTH SHOULDERS: ICD-10-CM

## 2020-01-16 DIAGNOSIS — R29.898 SHOULDER WEAKNESS: ICD-10-CM

## 2020-01-16 PROCEDURE — 97162 PT EVAL MOD COMPLEX 30 MIN: CPT | Mod: GP | Performed by: PHYSICAL THERAPIST

## 2020-01-16 PROCEDURE — 97110 THERAPEUTIC EXERCISES: CPT | Mod: GP | Performed by: PHYSICAL THERAPIST

## 2020-01-16 NOTE — PROGRESS NOTES
[facility] Initial Evaluation  Subjective:  The history is provided by the patient. No  was used.   Symptom: bilateral shoulder pain    Date of Onset: MD orders 1-9-20. Where condition occurred: for unknown reasons (right shoulder fall).HPI: Documentation: fall in 1990 right shoulder and degenerative changes, left degenerative changes.  and reported as 6/10 (6/10 with movement, 2/10 at rest) on pain scale. General health as reported by patient is good. Pertinent medical history includes:  Concussions/dizziness, high blood pressure and overweight.  Medical allergies: adhesives (skin sensitivity to nylon).    Current medications:  High blood pressure medication.   Primary job tasks include:  Driving and lifting/carrying.  Pain is described as sharp and aching and is constant. Pain is worse during the night. Since onset symptoms are gradually worsening. Special tests:  X-ray.     Occupation: Retired teacher.   Barriers include:  None as reported by patient.  Red flags:  Significant weakness and pain at rest/night.  Type of problem:  Bilateral shoulders   Condition occurred with:  A fall and degenerative joint disease. This is a chronic condition   Problem details: Pt reports deep aching in joint and anterior and posterior bilateral shoulders.   She notes increased pain with reaching with bilateral shoulders to the side and behind her back and pain with lifting at shoulder height. She states right is worse than the left. She has also had recent weight gain which she feels has contributed to increased pain. She feels better with rest.lying on back and avoiding painful movements. .   Patient reports pain:  In the joint, anterior and posterior. Radiates to:  Shoulder. Associated symptoms:  Loss of strength. Symptoms are exacerbated by lifting, carrying, using arm at shoulder level, lying on extremity, using arm overhead and using arm behind back and relieved by rest.                       Objective:  System              Cervical/Thoracic Evaluation    AROM:  AROM Cervical:    Flexion:          Wnl  Extension:       Wnl  Rotation:         Left: 75%     Right: wnl  Side Bend:      Left:     Right:     Strength: scapular stabilizers grossly 3/5 B                           Shoulder Evaluation:  ROM:  AROM:    Flexion:  Left:  168    Right:  155  Extension: Left: 60Right: 54  Abduction:  Left: 167   Right:  160    Internal Rotation:  Left:  61    Right:  70  External Rotation:  Left:  62    Right:  82                      Strength:    Flexion: Left:5/5    Pain: -    Right: 4+/5      Pain:  +  Extension:  Left: 5/5     Pain:-    Right: 5/5     Pain:-  Abduction:  Left: 5-/5   Pain:-    Right: 4/5      Pain:+  Adduction:  Left: 5/5     Pain:-    Right: 5/5      Pain:-  Internal Rotation:  Left:5/5      Pain:-    Right: 5-/5      Pain:+  External Rotation:   Left:4-/5      Pain:+   Right:4-/5      Pain:++      Horizontal Adduction:  Right:/5     Pain:-  Elbow Flexion:  Left:5/5      Pain:-    Right:5/5      Pain:-  Elbow Extension:  Left:5/5      Pain:-    Right:5/5      Pain:-      Palpation:    Left shoulder tenderness present at:  Acrimioclavicular; Biceps; Infraspinatus; Upper Trap and Bicipital Groove  Right shoulder tenderness present at: Acrimioclavicular; Biceps; Supraspinatus; Infraspinatus; Upper Trap and Bicipital Groove  Mobility Tests:  not assessed                                                 General     ROS    Assessment/Plan:    Patient is a 69 year old female with both sides shoulder complaints.    Patient has the following significant findings with corresponding treatment plan.                Diagnosis 1:  Right shoulder pain  Pain -  hot/cold therapy, US, manual therapy, self management and education  Decreased strength - therapeutic exercise, therapeutic activities and home program  Decreased function - therapeutic activities and home program  Diagnosis 2:  Left shoulder pain    Pain -  hot/cold therapy, manual therapy, self management, education and home program  Decreased ROM/flexibility - manual therapy, therapeutic exercise and home program  Decreased strength - therapeutic exercise, therapeutic activities and home program  Decreased function - therapeutic activities and home program    Therapy Evaluation Codes:   1) History comprised of:   Personal factors that impact the plan of care:      Past/current experiences and Time since onset of symptoms.    Comorbidity factors that impact the plan of care are:      High blood pressure, Osteoarthritis and Overweight.     Medications impacting care: High blood pressure.  2) Examination of Body Systems comprised of:   Body structures and functions that impact the plan of care:      Shoulder.   Activity limitations that impact the plan of care are:      Bathing, Cooking, Dressing, Lifting and Sleeping.  3) Clinical presentation characteristics are:   Evolving/Changing.  4) Decision-Making    Moderate complexity using standardized patient assessment instrument and/or measureable assessment of functional outcome.  Cumulative Therapy Evaluation is: Moderate complexity.    Previous and current functional limitations:  (See Goal Flow Sheet for this information)    Short term and Long term goals: (See Goal Flow Sheet for this information)     Communication ability:  Patient appears to be able to clearly communicate and understand verbal and written communication and follow directions correctly.  Treatment Explanation - The following has been discussed with the patient:   RX ordered/plan of care  Anticipated outcomes  Possible risks and side effects  This patient would benefit from PT intervention to resume normal activities.   Rehab potential is good.    Frequency:  1 X week, once daily  Duration:  for 5 weeks tapering to 2 X a month over 6 weeks  Discharge Plan:  Achieve all LTG.  Independent in home treatment program.  Reach maximal therapeutic  benefit.    Please refer to the daily flowsheet for treatment today, total treatment time and time spent performing 1:1 timed codes.

## 2020-01-23 ENCOUNTER — TELEPHONE (OUTPATIENT)
Dept: ORTHOPEDICS | Facility: CLINIC | Age: 70
End: 2020-01-23

## 2020-01-23 ENCOUNTER — THERAPY VISIT (OUTPATIENT)
Dept: PHYSICAL THERAPY | Facility: CLINIC | Age: 70
End: 2020-01-23
Payer: COMMERCIAL

## 2020-01-23 ENCOUNTER — HOSPITAL ENCOUNTER (OUTPATIENT)
Dept: MRI IMAGING | Facility: CLINIC | Age: 70
Discharge: HOME | End: 2020-01-23
Attending: FAMILY MEDICINE | Admitting: FAMILY MEDICINE
Payer: COMMERCIAL

## 2020-01-23 DIAGNOSIS — M25.511 CHRONIC PAIN OF BOTH SHOULDERS: ICD-10-CM

## 2020-01-23 DIAGNOSIS — M67.911 DYSFUNCTION OF RIGHT ROTATOR CUFF: ICD-10-CM

## 2020-01-23 DIAGNOSIS — M25.512 CHRONIC PAIN OF BOTH SHOULDERS: ICD-10-CM

## 2020-01-23 DIAGNOSIS — R29.898 SHOULDER WEAKNESS: ICD-10-CM

## 2020-01-23 DIAGNOSIS — G89.29 CHRONIC PAIN OF BOTH SHOULDERS: ICD-10-CM

## 2020-01-23 PROCEDURE — 73221 MRI JOINT UPR EXTREM W/O DYE: CPT | Mod: RT

## 2020-01-23 PROCEDURE — 97140 MANUAL THERAPY 1/> REGIONS: CPT | Mod: GP | Performed by: PHYSICAL THERAPIST

## 2020-01-23 PROCEDURE — 97110 THERAPEUTIC EXERCISES: CPT | Mod: GP | Performed by: PHYSICAL THERAPIST

## 2020-01-23 NOTE — TELEPHONE ENCOUNTER
Reviewed MRI results (complete cuff tearing with fatty atrophy) and plan was to follow-up in the office to discuss results. No appointment in chart as of today.    Could pursue surgical eval if desired.    Milton Park DO, CAM  [facility]

## 2020-01-28 ENCOUNTER — THERAPY VISIT (OUTPATIENT)
Dept: PHYSICAL THERAPY | Facility: CLINIC | Age: 70
End: 2020-01-28
Payer: COMMERCIAL

## 2020-01-28 DIAGNOSIS — M25.512 CHRONIC PAIN OF BOTH SHOULDERS: ICD-10-CM

## 2020-01-28 DIAGNOSIS — M25.511 CHRONIC PAIN OF BOTH SHOULDERS: ICD-10-CM

## 2020-01-28 DIAGNOSIS — G89.29 CHRONIC PAIN OF BOTH SHOULDERS: ICD-10-CM

## 2020-01-28 PROCEDURE — 97140 MANUAL THERAPY 1/> REGIONS: CPT | Mod: GP | Performed by: PHYSICAL THERAPIST

## 2020-01-28 PROCEDURE — 97110 THERAPEUTIC EXERCISES: CPT | Mod: GP | Performed by: PHYSICAL THERAPIST

## 2020-01-28 NOTE — TELEPHONE ENCOUNTER
Patient LVM asking about MRI results. Would like a call back.    Phone: [phone]    Cheryl Enriquez ATC

## 2020-01-28 NOTE — TELEPHONE ENCOUNTER
Discussed MRI results. Making some improvement with therapy - right is coming along slower than left.    Recommend staying the course with therapy at least for now.  If stalls or right worsens then can facilitate referral to surgery for conversation / surgical consideration.    Will follow-up as needed.    Milton Park DO, CAQSM  [facility]

## 2020-02-06 ENCOUNTER — THERAPY VISIT (OUTPATIENT)
Dept: PHYSICAL THERAPY | Facility: CLINIC | Age: 70
End: 2020-02-06
Payer: COMMERCIAL

## 2020-02-06 DIAGNOSIS — M25.512 CHRONIC PAIN OF BOTH SHOULDERS: ICD-10-CM

## 2020-02-06 DIAGNOSIS — M25.511 CHRONIC PAIN OF BOTH SHOULDERS: ICD-10-CM

## 2020-02-06 DIAGNOSIS — G89.29 CHRONIC PAIN OF BOTH SHOULDERS: ICD-10-CM

## 2020-02-06 PROCEDURE — 97110 THERAPEUTIC EXERCISES: CPT | Mod: GP | Performed by: PHYSICAL THERAPIST

## 2020-02-06 PROCEDURE — 97140 MANUAL THERAPY 1/> REGIONS: CPT | Mod: GP | Performed by: PHYSICAL THERAPIST

## 2020-02-08 ENCOUNTER — HEALTH MAINTENANCE LETTER (OUTPATIENT)
Age: 70
End: 2020-02-08

## 2020-02-20 ENCOUNTER — THERAPY VISIT (OUTPATIENT)
Dept: PHYSICAL THERAPY | Facility: CLINIC | Age: 70
End: 2020-02-20
Payer: COMMERCIAL

## 2020-02-20 DIAGNOSIS — M25.511 CHRONIC PAIN OF BOTH SHOULDERS: ICD-10-CM

## 2020-02-20 DIAGNOSIS — M25.512 CHRONIC PAIN OF BOTH SHOULDERS: ICD-10-CM

## 2020-02-20 DIAGNOSIS — G89.29 CHRONIC PAIN OF BOTH SHOULDERS: ICD-10-CM

## 2020-02-20 PROCEDURE — 97110 THERAPEUTIC EXERCISES: CPT | Mod: GP | Performed by: PHYSICAL THERAPIST

## 2020-02-20 PROCEDURE — 97140 MANUAL THERAPY 1/> REGIONS: CPT | Mod: GP | Performed by: PHYSICAL THERAPIST

## 2020-03-05 ENCOUNTER — THERAPY VISIT (OUTPATIENT)
Dept: PHYSICAL THERAPY | Facility: CLINIC | Age: 70
End: 2020-03-05
Payer: COMMERCIAL

## 2020-03-05 DIAGNOSIS — M25.512 CHRONIC PAIN OF BOTH SHOULDERS: ICD-10-CM

## 2020-03-05 DIAGNOSIS — G89.29 CHRONIC PAIN OF BOTH SHOULDERS: ICD-10-CM

## 2020-03-05 DIAGNOSIS — M25.511 CHRONIC PAIN OF BOTH SHOULDERS: ICD-10-CM

## 2020-03-05 PROCEDURE — 97112 NEUROMUSCULAR REEDUCATION: CPT | Mod: GP | Performed by: PHYSICAL THERAPIST

## 2020-03-05 PROCEDURE — 97110 THERAPEUTIC EXERCISES: CPT | Mod: GP | Performed by: PHYSICAL THERAPIST

## 2020-04-16 ENCOUNTER — TELEPHONE (OUTPATIENT)
Dept: PHYSICAL THERAPY | Facility: CLINIC | Age: 70
End: 2020-04-16

## 2020-04-16 DIAGNOSIS — M25.512 CHRONIC PAIN OF BOTH SHOULDERS: ICD-10-CM

## 2020-04-16 DIAGNOSIS — G89.29 CHRONIC PAIN OF BOTH SHOULDERS: ICD-10-CM

## 2020-04-16 DIAGNOSIS — M25.511 CHRONIC PAIN OF BOTH SHOULDERS: ICD-10-CM

## 2020-08-13 PROBLEM — M25.511 CHRONIC PAIN OF BOTH SHOULDERS: Status: RESOLVED | Noted: 2020-01-16 | Resolved: 2020-08-13

## 2020-08-13 PROBLEM — M25.512 CHRONIC PAIN OF BOTH SHOULDERS: Status: RESOLVED | Noted: 2020-01-16 | Resolved: 2020-08-13

## 2020-08-13 PROBLEM — G89.29 CHRONIC PAIN OF BOTH SHOULDERS: Status: RESOLVED | Noted: 2020-01-16 | Resolved: 2020-08-13

## 2020-10-14 DIAGNOSIS — I10 ESSENTIAL HYPERTENSION, BENIGN: ICD-10-CM

## 2020-10-14 RX ORDER — LISINOPRIL AND HYDROCHLOROTHIAZIDE 20; 25 MG/1; MG/1
1 TABLET ORAL DAILY
Qty: 90 TABLET | Refills: 0 | Status: SHIPPED | OUTPATIENT
Start: 2020-10-14 | End: 2021-01-14

## 2020-10-14 NOTE — TELEPHONE ENCOUNTER
Prescription approved per [facility] Refill Protocol.  Eloina Mcleod RN

## 2020-10-14 NOTE — TELEPHONE ENCOUNTER
Medication Question or Refill    Who is calling: Pt    What medication are you calling about (include dose and sig)?: lisinopril-hydrochlorothiazide (ZESTORETIC) 20-25 MG tablet    Controlled Substance Agreement on file: No    Who prescribed the medication?: AMS    Do you need a refill? Yes    When did you use the medication last? current    Patient offered an appointment? Yes  Annual Physical 12/22/2020    Do you have any questions or concerns?  Yes  [facility] saying that no refills on rx-pt has 1 week of rx remaining.    [facility]ed [facility]    Ok to leave a detailed message?: No at Cell number on file:    Telephone Information:   Mobile [phone]     Please call pt with questions and/or when rx has been refilled/completed.    Thank you.  lisa

## 2020-11-07 ENCOUNTER — HEALTH MAINTENANCE LETTER (OUTPATIENT)
Age: 70
End: 2020-11-07

## 2021-01-12 DIAGNOSIS — E78.5 HYPERLIPIDEMIA LDL GOAL <160: ICD-10-CM

## 2021-01-12 DIAGNOSIS — I10 ESSENTIAL HYPERTENSION, BENIGN: ICD-10-CM

## 2021-01-14 RX ORDER — ATORVASTATIN CALCIUM 20 MG/1
TABLET, FILM COATED ORAL
Qty: 90 TABLET | Refills: 0 | Status: SHIPPED | OUTPATIENT
Start: 2021-01-14 | End: 2021-01-22

## 2021-01-14 RX ORDER — LISINOPRIL AND HYDROCHLOROTHIAZIDE 20; 25 MG/1; MG/1
TABLET ORAL
Qty: 90 TABLET | Refills: 0 | Status: SHIPPED | OUTPATIENT
Start: 2021-01-14 | End: 2021-01-22

## 2021-01-14 NOTE — TELEPHONE ENCOUNTER
Medication is being filled for 1 time refill only due to:  Patient needs to be seen because it has been more than one year since last visit.  Pt has appointment scheduled, refill extended  Kika Thompson RN, BSN  Message handled by CLINIC NURSE.

## 2021-01-20 ASSESSMENT — ACTIVITIES OF DAILY LIVING (ADL): CURRENT_FUNCTION: NO ASSISTANCE NEEDED

## 2021-01-20 ASSESSMENT — ENCOUNTER SYMPTOMS
FREQUENCY: 1
CHILLS: 0
ABDOMINAL PAIN: 0
JOINT SWELLING: 1
CONSTIPATION: 0
HEMATURIA: 0
SHORTNESS OF BREATH: 0
WEAKNESS: 0
MYALGIAS: 1
COUGH: 0
PALPITATIONS: 0
PARESTHESIAS: 0
NERVOUS/ANXIOUS: 0
DIZZINESS: 0
NAUSEA: 0
EYE PAIN: 0
DYSURIA: 0
HEARTBURN: 0
ARTHRALGIAS: 1
HEMATOCHEZIA: 0
FEVER: 0
HEADACHES: 0
DIARRHEA: 1
SORE THROAT: 0
BREAST MASS: 0

## 2021-01-22 ENCOUNTER — OFFICE VISIT (OUTPATIENT)
Dept: PEDIATRICS | Facility: CLINIC | Age: 71
End: 2021-01-22
Payer: COMMERCIAL

## 2021-01-22 VITALS
DIASTOLIC BLOOD PRESSURE: 62 MMHG | TEMPERATURE: 97.9 F | SYSTOLIC BLOOD PRESSURE: 138 MMHG | HEART RATE: 82 BPM | OXYGEN SATURATION: 98 % | WEIGHT: 224.7 LBS | HEIGHT: 62 IN | BODY MASS INDEX: 41.35 KG/M2

## 2021-01-22 DIAGNOSIS — Z78.0 ASYMPTOMATIC POSTMENOPAUSAL STATUS: ICD-10-CM

## 2021-01-22 DIAGNOSIS — M79.641 PAIN IN BOTH HANDS: ICD-10-CM

## 2021-01-22 DIAGNOSIS — Z00.01 ENCOUNTER FOR PREVENTATIVE ADULT HEALTH CARE EXAM WITH ABNORMAL FINDINGS: Primary | ICD-10-CM

## 2021-01-22 DIAGNOSIS — Z83.49 FAMILY HISTORY OF HYPOTHYROIDISM: ICD-10-CM

## 2021-01-22 DIAGNOSIS — M79.642 PAIN IN BOTH HANDS: ICD-10-CM

## 2021-01-22 DIAGNOSIS — I10 ESSENTIAL HYPERTENSION, BENIGN: ICD-10-CM

## 2021-01-22 DIAGNOSIS — Z00.00 ENCOUNTER FOR MEDICARE ANNUAL WELLNESS EXAM: ICD-10-CM

## 2021-01-22 DIAGNOSIS — Z12.31 ENCOUNTER FOR SCREENING MAMMOGRAM FOR BREAST CANCER: ICD-10-CM

## 2021-01-22 DIAGNOSIS — H91.93 BILATERAL HEARING LOSS, UNSPECIFIED HEARING LOSS TYPE: ICD-10-CM

## 2021-01-22 DIAGNOSIS — E66.01 MORBID OBESITY (H): ICD-10-CM

## 2021-01-22 DIAGNOSIS — E78.5 HYPERLIPIDEMIA LDL GOAL <160: ICD-10-CM

## 2021-01-22 DIAGNOSIS — Z12.11 COLON CANCER SCREENING: ICD-10-CM

## 2021-01-22 LAB
CRP SERPL-MCNC: <2.9 MG/L (ref 0–8)
ERYTHROCYTE [SEDIMENTATION RATE] IN BLOOD BY WESTERGREN METHOD: 22 MM/H (ref 0–30)

## 2021-01-22 PROCEDURE — 86140 C-REACTIVE PROTEIN: CPT | Performed by: NURSE PRACTITIONER

## 2021-01-22 PROCEDURE — 85652 RBC SED RATE AUTOMATED: CPT | Performed by: NURSE PRACTITIONER

## 2021-01-22 PROCEDURE — 36415 COLL VENOUS BLD VENIPUNCTURE: CPT | Performed by: NURSE PRACTITIONER

## 2021-01-22 PROCEDURE — 86038 ANTINUCLEAR ANTIBODIES: CPT | Performed by: NURSE PRACTITIONER

## 2021-01-22 PROCEDURE — 99213 OFFICE O/P EST LOW 20 MIN: CPT | Mod: 25 | Performed by: NURSE PRACTITIONER

## 2021-01-22 PROCEDURE — 84443 ASSAY THYROID STIM HORMONE: CPT | Performed by: NURSE PRACTITIONER

## 2021-01-22 PROCEDURE — 86200 CCP ANTIBODY: CPT | Performed by: NURSE PRACTITIONER

## 2021-01-22 PROCEDURE — 80053 COMPREHEN METABOLIC PANEL: CPT | Performed by: NURSE PRACTITIONER

## 2021-01-22 PROCEDURE — 99397 PER PM REEVAL EST PAT 65+ YR: CPT | Mod: 25 | Performed by: NURSE PRACTITIONER

## 2021-01-22 PROCEDURE — 80061 LIPID PANEL: CPT | Performed by: NURSE PRACTITIONER

## 2021-01-22 PROCEDURE — 86431 RHEUMATOID FACTOR QUANT: CPT | Performed by: NURSE PRACTITIONER

## 2021-01-22 PROCEDURE — 82043 UR ALBUMIN QUANTITATIVE: CPT | Performed by: NURSE PRACTITIONER

## 2021-01-22 RX ORDER — LISINOPRIL AND HYDROCHLOROTHIAZIDE 20; 25 MG/1; MG/1
1 TABLET ORAL DAILY
Qty: 90 TABLET | Refills: 3 | Status: SHIPPED | OUTPATIENT
Start: 2021-01-22 | End: 2021-04-12

## 2021-01-22 RX ORDER — ATORVASTATIN CALCIUM 20 MG/1
20 TABLET, FILM COATED ORAL DAILY
Qty: 90 TABLET | Refills: 3 | Status: SHIPPED | OUTPATIENT
Start: 2021-01-22 | End: 2021-04-12

## 2021-01-22 RX ORDER — LISINOPRIL 20 MG/1
20 TABLET ORAL DAILY
Qty: 90 TABLET | Refills: 3 | Status: SHIPPED | OUTPATIENT
Start: 2021-01-22 | End: 2022-01-10

## 2021-01-22 ASSESSMENT — ENCOUNTER SYMPTOMS
FREQUENCY: 1
EYE PAIN: 0
DYSURIA: 0
HEMATOCHEZIA: 0
PALPITATIONS: 0
ARTHRALGIAS: 1
WEAKNESS: 0
ABDOMINAL PAIN: 0
HEADACHES: 0
CONSTIPATION: 0
DIARRHEA: 1
COUGH: 0
SHORTNESS OF BREATH: 0
NAUSEA: 0
PARESTHESIAS: 0
DIZZINESS: 0
HEARTBURN: 0
CHILLS: 0
FEVER: 0
SORE THROAT: 0
HEMATURIA: 0
MYALGIAS: 1
JOINT SWELLING: 1
BREAST MASS: 0
NERVOUS/ANXIOUS: 0

## 2021-01-22 ASSESSMENT — MIFFLIN-ST. JEOR: SCORE: 1492.48

## 2021-01-22 ASSESSMENT — ACTIVITIES OF DAILY LIVING (ADL): CURRENT_FUNCTION: NO ASSISTANCE NEEDED

## 2021-01-22 NOTE — PROGRESS NOTES
"  SUBJECTIVE:   Meggan Jones is a 70 year old female who presents for Preventive Visit.      Patient has been advised of split billing requirements and indicates understanding: Yes   Are you in the first 12 months of your Medicare coverage?  No    Healthy Habits:     In general, how would you rate your overall health?  Good    Frequency of exercise:  4-5 days/week    Duration of exercise:  15-30 minutes    Do you usually eat at least 4 servings of fruit and vegetables a day, include whole grains    & fiber and avoid regularly eating high fat or \"junk\" foods?  Yes    Taking medications regularly:  Yes    Medication side effects:  None    Ability to successfully perform activities of daily living:  No assistance needed    Home Safety:  No safety concerns identified    Hearing Impairment:  Difficulty following a conversation in a noisy restaurant or crowded room, feel that people are mumbling or not speaking clearly, need to ask people to speak up or repeat themselves, difficulty understanding soft or whispered speech and difficulty understanding speech on the telephone    In the past 6 months, have you been bothered by leaking of urine?  No    In general, how would you rate your overall mental or emotional health?  Good      PHQ-2 Total Score: 0    Do you feel safe in your environment? Yes    Have you ever done Advance Care Planning? (For example, a Health Directive, POLST, or a discussion with a medical provider or your loved ones about your wishes): No, advance care planning information given to patient to review.  Patient declined advance care planning discussion at this time.       Fall risk  Fallen 2 or more times in the past year?: No  Any fall with injury in the past year?: No    Cognitive Screening   1) Repeat 3 items (Leader, Season, Table)    2) Clock draw: NORMAL  3) 3 item recall: Recalls 3 objects  Results: 3 items recalled: COGNITIVE IMPAIRMENT LESS LIKELY    Mini-CogTM Copyright S Saúl. Licensed by " the author for use in [facility]; reprinted with permission (julito@[facility]). All rights reserved.      Do you have sleep apnea, excessive snoring or daytime drowsiness?: no    Reviewed and updated as needed this visit by clinical staff                 Reviewed and updated as needed this visit by Provider                Social History     Tobacco Use     Smoking status: Never Smoker     Smokeless tobacco: Never Used   Substance Use Topics     Alcohol use: Yes     Frequency: Monthly or less     Drinks per session: 1 or 2     Binge frequency: Never     Comment: rarely         Alcohol Use 1/20/2021   Prescreen: >3 drinks/day or >7 drinks/week? No   Prescreen: >3 drinks/day or >7 drinks/week? -   No flowsheet data found.          Current providers sharing in care for this patient include:   Patient Care Team:  Stephani Cruz APRN CNP as PCP - General (Family Practice)  Stephani Cruz APRN CNP as Assigned PCP    The following health maintenance items are reviewed in Epic and correct as of today:  Health Maintenance   Topic Date Due     ANNUAL REVIEW OF HM ORDERS  1950     ZOSTER IMMUNIZATION (2 of 3) 07/10/2013     COLORECTAL CANCER SCREENING  06/30/2020     INFLUENZA VACCINE (1) 09/01/2020     DEXA  10/15/2020     MEDICARE ANNUAL WELLNESS VISIT  12/12/2020     FALL RISK ASSESSMENT  12/12/2020     BMP  01/15/2021     CMP  01/15/2021     LIPID  01/15/2021     MICROALBUMIN  01/15/2021     ADVANCE CARE PLANNING  12/12/2024     DTAP/TDAP/TD IMMUNIZATION (3 - Td) 12/12/2029     HEPATITIS C SCREENING  Completed     PHQ-2  Completed     Pneumococcal Vaccine: 65+ Years  Completed     Pneumococcal Vaccine: Pediatrics (0 to 5 Years) and At-Risk Patients (6 to 64 Years)  Aged Out     IPV IMMUNIZATION  Aged Out     MENINGITIS IMMUNIZATION  Aged Out     HEPATITIS B IMMUNIZATION  Aged Out     Labs reviewed in [facility]  Mammogram Screening: Recommended mammography every 1-2 years with patient discussion  and risk factor consideration. Order placed for 2021 mammogram.     Review of Systems   Constitutional: Negative for chills and fever.   HENT: Positive for hearing loss. Negative for congestion, ear pain and sore throat.    Eyes: Positive for visual disturbance. Negative for pain.   Respiratory: Negative for cough and shortness of breath.    Cardiovascular: Negative for chest pain, palpitations and peripheral edema.   Gastrointestinal: Positive for diarrhea. Negative for abdominal pain, constipation, heartburn, hematochezia and nausea.   Breasts:  Negative for tenderness, breast mass and discharge.   Genitourinary: Positive for frequency. Negative for dysuria, genital sores, hematuria, pelvic pain, urgency, vaginal bleeding and vaginal discharge.   Musculoskeletal: Positive for arthralgias, joint swelling and myalgias.   Skin: Negative for rash.   Neurological: Negative for dizziness, weakness, headaches and paresthesias.   Psychiatric/Behavioral: The patient is not nervous/anxious.      CC: Patient here for her annual wellness exam.    HPI:  Reports mental health has been okay during the COVID-19 pandemic. She lives at home with her  and they have been following guidelines for social distancing, wearing a mask, and hand hygiene. She reports that she typically completes biannual dental exams and annual eye exams, but did not complete in 2020 due to the pandemic. She denies dental concerns or vision changes, and notes she intends to complete both exams this year. Patient is eligible and due for Shingrix vaccine, but will hold off until she completes COIVID-19 vaccine series. She reports worsening joint pain/ache and swelling in bilateral hands. She also notes further loss of hearing this year and interested in further evaluation. Patient also notes two moles that she would like to be further evaluated.     Past Medical History:   Diagnosis Date     Allergic rhinitis, cause unspecified      ASCUS favor benign  "2004, 2014    neg HPV Plan cotest in 3 yrs     Essential hypertension, benign      Lyme disease        OBJECTIVE:   LMP 01/26/2010  Estimated body mass index is 41.45 kg/m  as calculated from the following:    Height as of 1/9/20: 1.562 m (5' 1.5\").    Weight as of 1/9/20: 101.2 kg (223 lb).  Physical Exam  GENERAL APPEARANCE: healthy, alert and no distress  EYES: Eyes grossly normal to inspection, PERRL and conjunctivae and sclerae normal  HENT: ear canals and TM's normal  NECK: no adenopathy, no asymmetry, masses, or scars and thyroid normal to palpation  RESP: lungs clear to auscultation - no rales, rhonchi or wheezes  CV: regular rate and rhythm, normal S1 S2, no S3 or S4, no murmur, click or rub, no peripheral edema and peripheral pulses strong  ABDOMEN: soft, nontender, no hepatosplenomegaly, no masses and bowel sounds normal  MS: mild clubbing noted in fingers. Gait is age appropriate without ataxia  SKIN: Actinic keratsis noted anterior side of left calf  NEURO: Normal strength and tone, sensory exam grossly normal, mentation intact and speech normal  PSYCH: mentation appears normal and affect normal/bright    Diagnostic Test Results:  none     ASSESSMENT / PLAN:   1. Encounter for preventative adult health care exam with abnormal findings  Reports mental health has been okay during the COVID-19 pandemic. She lives at home with her  and they have been following guidelines for social distancing, wearing a mask, and hand hygiene. She reports that she typically completes biannual dental exams and annual eye exams, but did not complete in 2020 due to the pandemic. She denies dental concerns or vision changes, and notes she intends to complete both exams this year. Patient is eligible and due for Shingrix vaccine, but will hold off until she completes COIVID-19 vaccine series.     2. Morbid obesity (H)  BMI 41.10. Patient noting increased pain in bilateral knees and ankles. Discussed benefit of weight loss " "for presenting symptoms. Also discussed benefit of weight loss for cardiac health and management of hypertension and hyperlipidemia.     3. Essential hypertension, benign  Patient notes she has \"white coat\" syndrome and monitors blood pressure at home with SBP <140.   - COMPREHENSIVE METABOLIC PANEL  - Albumin Random Urine Quantitative with Creat Ratio  - lisinopril-hydrochlorothiazide (ZESTORETIC) 20-25 MG tablet; Take 1 tablet by mouth daily  Dispense: 90 tablet; Refill: 3  - lisinopril (ZESTRIL) 20 MG tablet; Take 1 tablet (20 mg) by mouth daily  Dispense: 90 tablet; Refill: 3    4. Family history of hypothyroidism  Patient requesting lab test because of family history.  - TSH with free T4 reflex    5. Pain in both hands  Patient report bilateral pain in both hands that has worsened since last wellness exam one year ago. Negative for gelling phenomenon/feeling more stiff in the mornings. Squeeze test negative on exam. Patient does have a family history of autoimmune diseases. Plan to complete lab work to evaluate for RA.  Saw podiatry in the past for known OA bilateral feet. If labs normal, recommend ortho consult  - Rheumatoid factor  - ESR: Erythrocyte sedimentation rate  - CRP, inflammation  - Anti Nuclear Regina IgG by IFA with Reflex  - Cyclic Citrullinated Peptide Antibody IgG    6. Colon cancer screening  - GASTROENTEROLOGY ADULT REF PROCEDURE ONLY; Future    7. Asymptomatic postmenopausal status  History of osteopenia  - DX Hip/Pelvis/Spine; Future    8. Hyperlipidemia LDL goal <160  Currently taking Lipitor. Repeat lipid panel drawn.   - Lipid Profile (Chol, Trig, HDL, LDL calc)  - atorvastatin (LIPITOR) 20 MG tablet; Take 1 tablet (20 mg) by mouth daily  Dispense: 90 tablet; Refill: 3    9. Bilateral hearing loss, unspecified hearing loss type  Reports worsening hearing loss in the last year. Patient notes she is unaware that her phone is ringing in the next room, but her  will hear it ringing. She " "also notes its difficult for her to hear if the person she is talking with has their back towards her. She notes she does not have difficulty hearing on the phone or in quiet environments when talking with people. She is interested in further evaluation and consult for hearing aid.  - AUDIOLOGY ADULT REFERRAL; Future    10. Encounter for screening mammogram for breast cancer    - MA Screen Bilateral w/Sammy; Future    Patient has been advised of split billing requirements and indicates understanding: Yes  COUNSELING:  Reviewed preventive health counseling, as reflected in patient instructions       Regular exercise       Healthy diet/nutrition    Estimated body mass index is 41.45 kg/m  as calculated from the following:    Height as of 1/9/20: 1.562 m (5' 1.5\").    Weight as of 1/9/20: 101.2 kg (223 lb).        She reports that she has never smoked. She has never used smokeless tobacco.      Appropriate preventive services were discussed with this patient, including applicable screening as appropriate for cardiovascular disease, diabetes, osteopenia/osteoporosis, and glaucoma.  As appropriate for age/gender, discussed screening for colorectal cancer, prostate cancer, breast cancer, and cervical cancer. Checklist reviewing preventive services available has been given to the patient.    Reviewed patients plan of care and provided an AVS. The Basic Care Plan (routine screening as documented in Health Maintenance) for Meggan meets the Care Plan requirement. This Care Plan has been established and reviewed with the Patient.    Counseling Resources:  ATP IV Guidelines  Pooled Cohorts Equation Calculator  Breast Cancer Risk Calculator  Breast Cancer: Medication to Reduce Risk  FRAX Risk Assessment  ICSI Preventive Guidelines  Dietary Guidelines for Americans, 2010  USDA's MyPlate  ASA Prophylaxis  Lung CA Screening    [facility]    Identified Health Risks:    The patient was " provided with written information regarding signs of hearing loss.

## 2021-01-22 NOTE — PATIENT INSTRUCTIONS
Referral placed for Hearing exam and hearing aid consultation.    Call your dermatologist for further skin evaluation. Message me on Mychart or call if you need a referral.     Schedule colonoscopy.    Medication refills sent for the year. Call to fill when needed.  Patient Education   Personalized Prevention Plan  You are due for the preventive services outlined below.  Your care team is available to assist you in scheduling these services.  If you have already completed any of these items, please share that information with your care team to update in your medical record.  Health Maintenance Due   Topic Date Due     Zoster (Shingles) Vaccine (2 of 3) 07/10/2013     Colorectal Cancer Screening  06/30/2020     Osteoporosis Screening  10/15/2020     FALL RISK ASSESSMENT  12/12/2020     Kidney Microalbumin Urine Test  01/15/2021       Signs of Hearing Loss      Hearing much better with one ear can be a sign of hearing loss.   Hearing loss is a problem shared by many people. In fact, it is one of the most common health problems, particularly as people age. Most people age 65 and older have some hearing loss. By age 80, almost everyone does. Hearing loss often occurs slowly over the years. So you may not realize your hearing has gotten worse.  Have your hearing checked  Call your healthcare provider if you:    Have to strain to hear normal conversation    Have to watch other people s faces very carefully to follow what they re saying    Need to ask people to repeat what they ve said    Often misunderstand what people are saying    Turn the volume of the television or radio up so high that others complain    Feel that people are mumbling when they re talking to you    Find that the effort to hear leaves you feeling tired and irritated    Notice, when using the phone, that you hear better with one ear than the other  [employer] last reviewed this educational content on 1/1/2020 2000-2020 [employer] [address]  [place] [address]. All rights reserved. This information is not intended as a substitute for professional medical care. Always follow your healthcare professional's instructions.

## 2021-01-23 LAB
ALBUMIN SERPL-MCNC: 3.9 G/DL (ref 3.4–5)
ALP SERPL-CCNC: 84 U/L (ref 40–150)
ALT SERPL W P-5'-P-CCNC: 39 U/L (ref 0–50)
ANION GAP SERPL CALCULATED.3IONS-SCNC: 5 MMOL/L (ref 3–14)
AST SERPL W P-5'-P-CCNC: 24 U/L (ref 0–45)
BILIRUB SERPL-MCNC: 0.5 MG/DL (ref 0.2–1.3)
BUN SERPL-MCNC: 18 MG/DL (ref 7–30)
CALCIUM SERPL-MCNC: 9.6 MG/DL (ref 8.5–10.1)
CHLORIDE SERPL-SCNC: 103 MMOL/L (ref 94–109)
CHOLEST SERPL-MCNC: 149 MG/DL
CO2 SERPL-SCNC: 27 MMOL/L (ref 20–32)
CREAT SERPL-MCNC: 0.85 MG/DL (ref 0.52–1.04)
CREAT UR-MCNC: 56 MG/DL
GFR SERPL CREATININE-BSD FRML MDRD: 70 ML/MIN/{1.73_M2}
GLUCOSE SERPL-MCNC: 78 MG/DL (ref 70–99)
HDLC SERPL-MCNC: 39 MG/DL
LDLC SERPL CALC-MCNC: 87 MG/DL
MICROALBUMIN UR-MCNC: <5 MG/L
MICROALBUMIN/CREAT UR: NORMAL MG/G CR (ref 0–25)
NONHDLC SERPL-MCNC: 110 MG/DL
POTASSIUM SERPL-SCNC: 4.6 MMOL/L (ref 3.4–5.3)
PROT SERPL-MCNC: 8.1 G/DL (ref 6.8–8.8)
SODIUM SERPL-SCNC: 135 MMOL/L (ref 133–144)
TRIGL SERPL-MCNC: 115 MG/DL
TSH SERPL DL<=0.005 MIU/L-ACNC: 3.06 MU/L (ref 0.4–4)

## 2021-01-25 LAB
ANA SER QL IF: NEGATIVE
CCP AB SER IA-ACNC: 2 U/ML
RHEUMATOID FACT SER NEPH-ACNC: <7 IU/ML (ref 0–20)

## 2021-01-26 DIAGNOSIS — M79.642 PAIN IN BOTH HANDS: Primary | ICD-10-CM

## 2021-01-26 DIAGNOSIS — M79.641 PAIN IN BOTH HANDS: Primary | ICD-10-CM

## 2021-01-26 NOTE — RESULT ENCOUNTER NOTE
Your labs look good.  Your labs looking for an autoimmune cause for your joint pain are all normal/negative. I recommend seeing an orthopedist for hand and foot pain. You can call [phone] to schedule.   The remainder of your labs are stable.   Angelica Burns, CNP

## 2021-02-03 ENCOUNTER — ANCILLARY PROCEDURE (OUTPATIENT)
Dept: BONE DENSITY | Facility: CLINIC | Age: 71
End: 2021-02-03
Payer: COMMERCIAL

## 2021-02-03 ENCOUNTER — ANCILLARY PROCEDURE (OUTPATIENT)
Dept: MAMMOGRAPHY | Facility: CLINIC | Age: 71
End: 2021-02-03
Payer: COMMERCIAL

## 2021-02-03 DIAGNOSIS — Z12.31 VISIT FOR SCREENING MAMMOGRAM: ICD-10-CM

## 2021-02-03 DIAGNOSIS — Z78.0 ASYMPTOMATIC POSTMENOPAUSAL STATUS: ICD-10-CM

## 2021-02-03 PROCEDURE — 77085 DXA BONE DENSITY AXL VRT FX: CPT | Performed by: FAMILY MEDICINE

## 2021-02-03 PROCEDURE — 77067 SCR MAMMO BI INCL CAD: CPT | Mod: TC | Performed by: RADIOLOGY

## 2021-02-05 ENCOUNTER — ANCILLARY PROCEDURE (OUTPATIENT)
Dept: GENERAL RADIOLOGY | Facility: CLINIC | Age: 71
End: 2021-02-05
Attending: FAMILY MEDICINE
Payer: COMMERCIAL

## 2021-02-05 ENCOUNTER — OFFICE VISIT (OUTPATIENT)
Dept: ORTHOPEDICS | Facility: CLINIC | Age: 71
End: 2021-02-05
Attending: NURSE PRACTITIONER
Payer: COMMERCIAL

## 2021-02-05 VITALS
HEIGHT: 62 IN | SYSTOLIC BLOOD PRESSURE: 158 MMHG | WEIGHT: 224 LBS | DIASTOLIC BLOOD PRESSURE: 62 MMHG | BODY MASS INDEX: 41.22 KG/M2

## 2021-02-05 DIAGNOSIS — M79.642 PAIN IN BOTH HANDS: ICD-10-CM

## 2021-02-05 DIAGNOSIS — M79.641 PAIN IN BOTH HANDS: ICD-10-CM

## 2021-02-05 DIAGNOSIS — M19.041 OSTEOARTHRITIS OF RIGHT MIDDLE FINGER: ICD-10-CM

## 2021-02-05 DIAGNOSIS — M19.032 LOCALIZED PRIMARY OSTEOARTHRITIS OF CARPOMETACARPAL (CMC) JOINT OF LEFT WRIST: Primary | ICD-10-CM

## 2021-02-05 DIAGNOSIS — M19.042 OSTEOARTHRITIS OF LEFT MIDDLE FINGER: ICD-10-CM

## 2021-02-05 PROCEDURE — 73130 X-RAY EXAM OF HAND: CPT | Mod: LT | Performed by: FAMILY MEDICINE

## 2021-02-05 PROCEDURE — 99204 OFFICE O/P NEW MOD 45 MIN: CPT | Performed by: FAMILY MEDICINE

## 2021-02-05 ASSESSMENT — MIFFLIN-ST. JEOR: SCORE: 1489.31

## 2021-02-05 NOTE — LETTER
2/5/2021         RE: Meggan Jones  [address] [address]        Dear Colleague,    Thank you for referring your patient, Meggan Jones, to the [facility] [place]. Please see a copy of my visit note below.    ASSESSMENT & PLAN  Patient Instructions     1. Localized primary osteoarthritis of carpometacarpal (CMC) joint of left wrist    2. Pain in both hands    3. Osteoarthritis of left middle finger    4. Osteoarthritis of right middle finger      -Patient has bilateral hand pain stiffness and deformity due to arthritis  -Patient will start formal physical therapy and home exercise program  -Should limit the amount of ibuprofen she takes.  Patient may try 1 to 2 tablets of extra Tylenol as needed for pain.  Patient may also try over-the-counter Voltaren gel 1% to be applied to painful areas 2-3 times a day as needed.  -Patient was advised that if pain worsens in the future to consider possible intra-articular cortisone injections.  -We reviewed her inflammatory blood tests which did not show any signs of rheumatoid or autoimmune arthritis  -Call direct clinic number [[phone]] at any time with questions or concerns.    Albert Yeo MD [facility]  [facility] [place]          -----    SUBJECTIVE  Meggan Jones is a/an 70 year old Right handed female who is seen in consultation at the request of  Angelica Burns C.N.P. for evaluation of bilateral hand pain. The patient is seen by themselves.    Onset: the last few  years(s) ago. Reports insidious onset without acute precipitating event. Has noticed that her knuckles have been swelling the last few years, but the last year all of her fingers have been more painful. States pcp did blood work for RA and those results came back negative  Location of Pain: bilateral all of her fingers, feels like lost range of motion  Rating of Pain at worst: 5/10  Rating of  Pain Currently: 4/10  Worsened by: gripping, twisting, lifting, sweeping, holding on to the steering wheel, when fingers get pulled.   Better with: ibuprofen   Treatments tried: heat and ibuprofen  Associated symptoms: swelling, warmth, redness and weakness of  strength  Orthopedic history: YES - Date: Left middle finger tip of finger was cut off in 6th grade  Relevant surgical history: NO  Social history: social history: retired    Past Medical History:   Diagnosis Date     Allergic rhinitis, cause unspecified      ASCUS favor benign 2004, 2014    neg HPV Plan cotest in 3 yrs     Essential hypertension, benign      Lyme disease      Social History     Socioeconomic History     Marital status:      Spouse name: Not on file     Number of children: Not on file     Years of education: Not on file     Highest education level: Bachelor's degree (e.g., BA, AB, BS)   Occupational History     Not on file   Social Needs     Financial resource strain: Not very hard     Food insecurity     Worry: Never true     Inability: Never true     Transportation needs     Medical: No     Non-medical: No   Tobacco Use     Smoking status: Never Smoker     Smokeless tobacco: Never Used   Substance and Sexual Activity     Alcohol use: Yes     Frequency: Monthly or less     Drinks per session: 1 or 2     Binge frequency: Never     Comment: rarely     Drug use: No     Sexual activity: Yes     Partners: Male     Birth control/protection: Surgical     Comment: tubal ligation   Lifestyle     Physical activity     Days per week: 2 days     Minutes per session: 30 min     Stress: Only a little   Relationships     Social connections     Talks on phone: More than three times a week     Gets together: Twice a week     Attends Mandaen service: More than 4 times per year     Active member of club or organization: Yes     Attends meetings of clubs or organizations: More than 4 times per year     Relationship status:      Intimate  "partner violence     Fear of current or ex partner: Not on file     Emotionally abused: Not on file     Physically abused: Not on file     Forced sexual activity: Not on file   Other Topics Concern      Service No     Blood Transfusions No     Caffeine Concern Yes     Comment: 2 cups per day     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 No   Social History Narrative     since 1971 , going well, went back to work in 2013   , 3 children-3 daughters-40 , 37 and 35 yo, 5 granchildren,  no pets         Patient's past medical, surgical, social, and family histories were reviewed today and no changes are noted.    REVIEW OF SYSTEMS:  10 point ROS is negative other than symptoms noted above in HPI, Past Medical History or as stated below  Constitutional: NEGATIVE for fever, chills, change in weight  Skin: NEGATIVE for worrisome rashes, moles or lesions  GI/: NEGATIVE for bowel or bladder changes  Neuro: NEGATIVE for weakness, dizziness or paresthesias    OBJECTIVE:  BP (!) 158/62   Ht 1.575 m (5' 2\")   Wt 101.6 kg (224 lb)   LMP 01/26/2010   BMI 40.97 kg/m     General: healthy, alert and in no distress  HEENT: no scleral icterus or conjunctival erythema  Skin: no suspicious lesions or rash. No jaundice.  CV: regular rhythm by palpation  Resp: normal respiratory effort without conversational dyspnea   Psych: normal mood and affect  Gait: normal steady gait with appropriate coordination and balance  Neuro: normal light touch sensory exam of the bilateral hands.    MSK:  BILATERAL HAND  Inspection:    Swollen, radially deviated 3rd digits b/l   Palpation:   " Carpals: normal   Metacarpals: normal   Thumb: CMC (left)   Fingers: DIP joint middle finger  Range of Motion:    Full active flexion and extension at MCP, PIP, and DIP joints; normal finger cascade without malrotation.  Wrist pronation, supination, and ulnar/radial deviation normal.  Strength:    Grossly intact  Special Tests:    Positive: none    Negative: flexor digitorum superficialis testing, flexor digitorum profundus testing    Independent visualization of the below image:  Recent Results (from the past 24 hour(s))   XR Hand Bilateral G/E 3 Views    Narrative    Bilateral middle finger DIP joint space narrowing, osteophytes and radial   deviation of distal phalanx.  Left first CMC joint space narrowing and   osteophytes.  No acute fracture or dislocation.         Albert Yeo MD [facility]        Again, thank you for allowing me to participate in the care of your patient.        Sincerely,        Albert Yeo, MD

## 2021-02-05 NOTE — NURSING NOTE
heidi to follow up with Primary Care provider regarding elevated blood pressure.    Corazon Virgen MS, ATC

## 2021-02-05 NOTE — PROGRESS NOTES
ASSESSMENT & PLAN  Patient Instructions     1. Localized primary osteoarthritis of carpometacarpal (CMC) joint of left wrist    2. Pain in both hands    3. Osteoarthritis of left middle finger    4. Osteoarthritis of right middle finger      -Patient has bilateral hand pain stiffness and deformity due to arthritis  -Patient will start formal physical therapy and home exercise program  -Should limit the amount of ibuprofen she takes.  Patient may try 1 to 2 tablets of extra Tylenol as needed for pain.  Patient may also try over-the-counter Voltaren gel 1% to be applied to painful areas 2-3 times a day as needed.  -Patient was advised that if pain worsens in the future to consider possible intra-articular cortisone injections.  -We reviewed her inflammatory blood tests which did not show any signs of rheumatoid or autoimmune arthritis  -Call direct clinic number [[phone]] at any time with questions or concerns.    Albert Yeo MD [facility] Orthopedics and Sports Medicine  [facility]          -----    SUBJECTIVE  Meggan Jones is a/an 70 year old Right handed female who is seen in consultation at the request of  Angelica Burns C.N.P. for evaluation of bilateral hand pain. The patient is seen by themselves.    Onset: the last few  years(s) ago. Reports insidious onset without acute precipitating event. Has noticed that her knuckles have been swelling the last few years, but the last year all of her fingers have been more painful. States pcp did blood work for RA and those results came back negative  Location of Pain: bilateral all of her fingers, feels like lost range of motion  Rating of Pain at worst: 5/10  Rating of Pain Currently: 4/10  Worsened by: gripping, twisting, lifting, sweeping, holding on to the steering wheel, when fingers get pulled.   Better with: ibuprofen   Treatments tried: heat and ibuprofen  Associated symptoms: swelling, warmth, redness and weakness of   strength  Orthopedic history: YES - Date: Left middle finger tip of finger was cut off in 6th grade  Relevant surgical history: NO  Social history: social history: retired    Past Medical History:   Diagnosis Date     Allergic rhinitis, cause unspecified      ASCUS favor benign 2004, 2014    neg HPV Plan cotest in 3 yrs     Essential hypertension, benign      Lyme disease      Social History     Socioeconomic History     Marital status:      Spouse name: Not on file     Number of children: Not on file     Years of education: Not on file     Highest education level: Bachelor's degree (e.g., BA, AB, BS)   Occupational History     Not on file   Social Needs     Financial resource strain: Not very hard     Food insecurity     Worry: Never true     Inability: Never true     Transportation needs     Medical: No     Non-medical: No   Tobacco Use     Smoking status: Never Smoker     Smokeless tobacco: Never Used   Substance and Sexual Activity     Alcohol use: Yes     Frequency: Monthly or less     Drinks per session: 1 or 2     Binge frequency: Never     Comment: rarely     Drug use: No     Sexual activity: Yes     Partners: Male     Birth control/protection: Surgical     Comment: tubal ligation   Lifestyle     Physical activity     Days per week: 2 days     Minutes per session: 30 min     Stress: Only a little   Relationships     Social connections     Talks on phone: More than three times a week     Gets together: Twice a week     Attends Druze service: More than 4 times per year     Active member of club or organization: Yes     Attends meetings of clubs or organizations: More than 4 times per year     Relationship status:      Intimate partner violence     Fear of current or ex partner: Not on file     Emotionally abused: Not on file     Physically abused: Not on file     Forced sexual activity: Not on file   Other Topics Concern      Service No     Blood Transfusions No     Caffeine Concern  "Yes     Comment: 2 cups per day     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 No   Social History Narrative     since 1971 , going well, went back to work in 2013   , 3 children-3 daughters-40 , 37 and 35 yo, 5 granchildren,  no pets         Patient's past medical, surgical, social, and family histories were reviewed today and no changes are noted.    REVIEW OF SYSTEMS:  10 point ROS is negative other than symptoms noted above in HPI, Past Medical History or as stated below  Constitutional: NEGATIVE for fever, chills, change in weight  Skin: NEGATIVE for worrisome rashes, moles or lesions  GI/: NEGATIVE for bowel or bladder changes  Neuro: NEGATIVE for weakness, dizziness or paresthesias    OBJECTIVE:  BP (!) 158/62   Ht 1.575 m (5' 2\")   Wt 101.6 kg (224 lb)   LMP 01/26/2010   BMI 40.97 kg/m     General: healthy, alert and in no distress  HEENT: no scleral icterus or conjunctival erythema  Skin: no suspicious lesions or rash. No jaundice.  CV: regular rhythm by palpation  Resp: normal respiratory effort without conversational dyspnea   Psych: normal mood and affect  Gait: normal steady gait with appropriate coordination and balance  Neuro: normal light touch sensory exam of the bilateral hands.    MSK:  BILATERAL HAND  Inspection:    Swollen, radially deviated 3rd digits b/l   Palpation:   Carpals: normal   Metacarpals: normal   Thumb: CMC (left)   Fingers: DIP joint middle finger  Range of Motion:    Full active flexion and extension at MCP, PIP, and DIP joints; normal finger cascade without malrotation.  Wrist pronation, supination, and ulnar/radial " deviation normal.  Strength:    Grossly intact  Special Tests:    Positive: none    Negative: flexor digitorum superficialis testing, flexor digitorum profundus testing    Independent visualization of the below image:  Recent Results (from the past 24 hour(s))   XR Hand Bilateral G/E 3 Views    Narrative    Bilateral middle finger DIP joint space narrowing, osteophytes and radial   deviation of distal phalanx.  Left first CMC joint space narrowing and   osteophytes.  No acute fracture or dislocation.         Albert Yeo MD [facility]

## 2021-02-05 NOTE — PATIENT INSTRUCTIONS
1. Localized primary osteoarthritis of carpometacarpal (CMC) joint of left wrist    2. Pain in both hands    3. Osteoarthritis of left middle finger    4. Osteoarthritis of right middle finger      -Patient has bilateral hand pain stiffness and deformity due to arthritis  -Patient will start formal physical therapy and home exercise program  -Should limit the amount of ibuprofen she takes.  Patient may try 1 to 2 tablets of extra Tylenol as needed for pain.  Patient may also try over-the-counter Voltaren gel 1% to be applied to painful areas 2-3 times a day as needed.  -Patient was advised that if pain worsens in the future to consider possible intra-articular cortisone injections.  -We reviewed her inflammatory blood tests which did not show any signs of rheumatoid or autoimmune arthritis  -Call direct clinic number [[phone]] at any time with questions or concerns.    Albert Yeo MD [facility] Medicine  [facility] [place]

## 2021-02-09 NOTE — RESULT ENCOUNTER NOTE
Your DEXA scan continues to show reduced bone density (osteopenia) but not osteoporosis. Continue with calcium and vitamin D supplement, but no additional medications are needed.  Angelica Burns, CNP

## 2021-04-10 DIAGNOSIS — E78.5 HYPERLIPIDEMIA LDL GOAL <160: ICD-10-CM

## 2021-04-10 DIAGNOSIS — I10 ESSENTIAL HYPERTENSION, BENIGN: ICD-10-CM

## 2021-04-12 RX ORDER — LISINOPRIL AND HYDROCHLOROTHIAZIDE 20; 25 MG/1; MG/1
TABLET ORAL
Qty: 30 TABLET | Refills: 0 | Status: SHIPPED | OUTPATIENT
Start: 2021-04-12 | End: 2022-01-20

## 2021-04-12 RX ORDER — ATORVASTATIN CALCIUM 20 MG/1
TABLET, FILM COATED ORAL
Qty: 90 TABLET | Refills: 2 | Status: SHIPPED | OUTPATIENT
Start: 2021-04-12 | End: 2022-01-20

## 2021-04-12 NOTE — TELEPHONE ENCOUNTER
Atorvastatin- Prescription approved per [facility] Refill Protocol.    Routing refill request to provider for review/approval because:  Failing bp    Izzy Lewis RN

## 2021-04-13 ENCOUNTER — THERAPY VISIT (OUTPATIENT)
Dept: OCCUPATIONAL THERAPY | Facility: CLINIC | Age: 71
End: 2021-04-13
Attending: FAMILY MEDICINE
Payer: COMMERCIAL

## 2021-04-13 DIAGNOSIS — M19.041 OSTEOARTHRITIS OF RIGHT MIDDLE FINGER: ICD-10-CM

## 2021-04-13 DIAGNOSIS — M79.641 PAIN IN BOTH HANDS: ICD-10-CM

## 2021-04-13 DIAGNOSIS — M79.642 PAIN IN BOTH HANDS: ICD-10-CM

## 2021-04-13 DIAGNOSIS — M19.032 LOCALIZED PRIMARY OSTEOARTHRITIS OF CARPOMETACARPAL (CMC) JOINT OF LEFT WRIST: ICD-10-CM

## 2021-04-13 DIAGNOSIS — M19.042 OSTEOARTHRITIS OF LEFT MIDDLE FINGER: ICD-10-CM

## 2021-04-13 PROCEDURE — 97165 OT EVAL LOW COMPLEX 30 MIN: CPT | Mod: GO | Performed by: OCCUPATIONAL THERAPIST

## 2021-04-13 PROCEDURE — 97110 THERAPEUTIC EXERCISES: CPT | Mod: GO | Performed by: OCCUPATIONAL THERAPIST

## 2021-04-13 PROCEDURE — 97760 ORTHOTIC MGMT&TRAING 1ST ENC: CPT | Mod: GO | Performed by: OCCUPATIONAL THERAPIST

## 2021-04-13 NOTE — PROGRESS NOTES
Hand Therapy Initial Evaluation  Current Date:  4/13/2021    Subjective:  Meggan Jones is a 70 year old right hand dominant female.    Diagnosis: L CMC OA and B long finger arthritis  DOI:  ~18 mos ago (2/5/21 MD order date)    Patient reports symptoms of pain, stiffness/loss of motion, weakness/loss of strength and edema of the L thumb and B long fingers which occurred due to gradual onset. Since onset symptoms are gradually getting worse. Special tests:  x-rays: + for arthritis.  Previous treatment: ibuprofen, keeping hands warm. General health as reported by patient is good.  Pertinent medical history includes: High Blood Pressure, Osteoporosis, Pain at Night/Rest.  Medical allergies: Nylon.  Surgical history: other: tubal ligation.  Medication history: Anti-inflammatory, High Blood Pressure.    Occupational Profile Information:  Current occupation is retired  Prior functional level:  no limitations  Barriers include: none  Mobility: No difficulty  Transportation: drives  Leisure activities/hobbies: sewing, reading--books, walking, playing with grand kids (4, 6 year olds are close by)    Upper Extremity Functional Index Score:  SCORE:   Column Totals: /80: 65   (A lower score indicates greater disability.)    Objective:  Pain Level (Scale 0-10):   4/13/2021   At Rest 1   With Use 5 L, 4 R     Pain Description:  Date 4/13/2021   Location L thumb CMC, L>R DIP joint of long finger   Pain Quality Aching and Sharp   Frequency constant     Pain is worst Daytime   Exacerbated by Sustained , lifting, sewing, driving   Relieved by Heat, [place]   Progression Gradually worsening     Palpation   Pain Scale 0-10/10   4/13/2021   CMC Joint Line R: 2  L: 4   Thenar Clark R: 1  L: 3   Web Space R: 4  L: 6   FCR R: 0  L: 3   DIP of long finger R: 5 dorsally and ulnarly, 4 radially and 0 volarly  L: 7 dorsally, 5 ulnarly, 8 radially, 4 volarly     ROM  Thumb 4/13/2021 4/13/2021   AROM  (PROM) R L    MP 0/15 0/44   IP +32/70 +26/73   RABD 35 38   PABD 45 43   Kapandji Opposition Scale (0-10/10) 10 10     Finger AROM: WNL, radial deviation at DIP of the long finger R: 10 L: 24    Thumb Observation/Appearance   Pain Scale 0-10/10    4/13/2021   Shoulder deformity present over CMC R: -  L: +   Edema over the CMC joint R: -  L: +   Noted collapse of MP into hyperextension during pinch R: +  L: +      Provocative Tests  Pain Scale 0-10/10    4/13/2021   CMC Adduction Stress Test R: 0  L: 3   CMC Extension Stress Test R: 2  L: 5   Finkelstein's R: 0  L: 1       Strength   (Measured in pounds)  Pain Scale 0-10/10   4/13/2021 4/13/2021   Trials R L   1  2  3 40 42   Average 40 42     Lat Pinch 4/13/2021 4/13/2021   Trials R L   1  2  3 11 8   Average 11 8     3 Pt Pinch 4/13/2021 4/13/2021   Trials R L   1  2  3 11 10   Average 11 10     Assessment:  Patient presents with symptoms consistent with diagnosis of L CMC thumb and B long finger DIP arthritis, with conservative intervention.    Patient s limitations or Problem List includes: Pain, Decreased ROM/motion, weakness, decreased stability of the CMC joint, decreased  and pinch strength of the thumb which interferes with patients ability to perform  Self Care Tasks (eating), Sleep Patterns, Recreational Activities, Household Chores and Driving   as compared to previous level of function.    Rehab Potential: Good- Return to full activity, some limitations.    Patient will benefit from skilled Occupational Therapy to increase ROM, flexibility, pinch strength, and stability of the thumb and decrease pain to return to previous activity level and resume normal daily tasks and to reach their rehab potential.    Barriers to Learning:  No barrier    Communication Issues: Patient appears to be able to clearly communicate and understand verbal and written communication and follow directions correctly.    Chart Review: Chart Review and Simple history review with  patient    Identified Performance Deficits: driving and community mobility, health management and maintenance, home establishment and management, meal preparation and cleanup, shopping, sleep and leisure activities    Assessment of Occupational Performance:  5 or more Performance Deficits    Clinical Decision Making (Complexity): Low complexity    Treatment Explanations:  The following has been discussed with the patient,  Rx ordered/plan of care  Anticipated outcomes  Possible risks and side effects    Treatment Plan:  Frequency:  1 X week, once daily  Duration:  for 4 weeks    Treatment Plan:  Therapeutic Exercise: tendon gliding, isotonics, A/PROM, Isometrics, and Stabilization exercises of the Thumb CMC, including  active and resisted abduction, 1st DI strengthening.  Manual Techniques: Joint Mobilization or reseating of the trapezium, MFR  Self Care: Adaptive Equipment, Ergonomics  Orthosis:  Hand based Thumb Spica, CMC support (allowing more thumb mobility), finger gutter orthores  Education: anatomy of CMC, joint protection principles, adaptive equipment as needed.    Home Program:  Exercise: Place and hold of the Stabilization Program 1-2 times a day, tendon gliding, thumb adductor release  Orthosis: Hand based Thumb Spica initially during the day progressing to at night,     Discharge Plan:  Achieve all LTG  [place] in home treatment program.  Reach maximal therapeutic benefit.  Please refer to the daily flowsheet for treatment today and total treatment time.      Next Visit:  Fabricate finger gutter orthoses for B long fingers  Progress thumb stability program as able  MFR  A/PROM

## 2021-04-14 ENCOUNTER — TRANSFERRED RECORDS (OUTPATIENT)
Dept: HEALTH INFORMATION MANAGEMENT | Facility: CLINIC | Age: 71
End: 2021-04-14

## 2021-05-25 ENCOUNTER — TELEPHONE (OUTPATIENT)
Dept: PEDIATRICS | Facility: CLINIC | Age: 71
End: 2021-05-25

## 2021-05-25 DIAGNOSIS — D22.9 ATYPICAL MOLE: Primary | ICD-10-CM

## 2021-05-25 NOTE — TELEPHONE ENCOUNTER
Left message for patient to call back. Gave her PAL number to call back. Will route to appropriate pool. Tonya Sainz RN on 5/25/2021 at 10:51 AM

## 2021-05-25 NOTE — TELEPHONE ENCOUNTER
Reason for call:  Symptom   Symptom or request: Spot on skin and is changing    Duration (how long have symptoms been present): Over a year   Have you been treated for this before? No    Additional comments: na    Phone number to reach patient:  Cell number on file:    Telephone Information:   Mobile [phone]       Best Time:  any    Can we leave a detailed message on this number?  YES    Travel screening: Not Applicable

## 2021-05-27 PROBLEM — M19.041 OSTEOARTHRITIS OF RIGHT MIDDLE FINGER: Status: RESOLVED | Noted: 2021-04-13 | Resolved: 2021-05-27

## 2021-05-27 PROBLEM — M19.032 LOCALIZED PRIMARY OSTEOARTHRITIS OF CARPOMETACARPAL (CMC) JOINT OF LEFT WRIST: Status: RESOLVED | Noted: 2021-04-13 | Resolved: 2021-05-27

## 2021-05-27 PROBLEM — M79.642 PAIN IN BOTH HANDS: Status: RESOLVED | Noted: 2021-04-13 | Resolved: 2021-05-27

## 2021-05-27 PROBLEM — M19.042: Status: RESOLVED | Noted: 2021-04-13 | Resolved: 2021-05-27

## 2021-05-27 PROBLEM — M79.641 PAIN IN BOTH HANDS: Status: RESOLVED | Noted: 2021-04-13 | Resolved: 2021-05-27

## 2021-05-27 NOTE — TELEPHONE ENCOUNTER
"Pt called PAL.    Recommended pt seek dermatology. Pt to reach out if they require referral.      - Bipin \"Ziyad\" KAREN Lobo - Patient Advocate Liason (PAL)  [facility]    "

## 2021-07-20 VITALS — WEIGHT: 213.7 LBS | BODY MASS INDEX: 37.86 KG/M2 | HEIGHT: 63 IN

## 2021-07-20 NOTE — PROGRESS NOTES
"[facility] Care Note    Assessment:    Meggan Jones is a 67 y.o. old female with HTN, obesity and HL who is here for f/u after a recent ED visit for chest discomfort.        Plan:  # Chest discomfort - largely atypical sxs. In the setting of a URI but she has risk factors of obesity and age  - will get an exercise stress echo. If normal, focus on aggressive risk factor modification, if abnormal, proceed w/ angiography  - continue ASA    # HL- check lipid panel  - continue atorva (she takes 20mg daily)    # Obesity - extensive counseling in the office for importance of diet/exercise    # HTN - continue lisinopril-HCTZ    GEMA WILLIAM  [facility] HEART [facility]  [phone]  ______________________________________________________________________    Subjective:  CC: Chest discomfort    I had the opportunity to see Meggan Jones at the [facility]. Meggan Jones is a 67 y.o. female with a known history of HTN, obesity and HL who is here for f/u after a recent ED visit for chest discomfort.    She hasn't been able to exercise x4-5 mos due to a ganglion cyst on her leg. ~ 2wks. Ago she started noticing some OLIVAS, w/ a longer recovery time to get back to normal breathing/heart rate. She also notices staying more winded w/ carrying her grandchild up the stairs. She also c/o occasional numbness in both hands when she wakes up, and some discomfort w/ lifting her arms over her head or lifting a full pot of coffee.     Eventually, she presents after an ED visit on 10/21, when she was seen for chest discomfort, which felt like \"she could feel her heart beat in her ears\" and started on the night prior to her ED visit. Her chest felt like \"pressure in R and L shoulder\", exacerbated w/ moving her arms. She also had a fever of up to 102.6, no cough + postnasal drip. Her EKG showed ST in low 100's, TnI was -, and CT for PE unremarkable. She was dc'ed and continued to maintain adequate fluid intake. Eventually, " her fever broke over the weekend, and she has felt like she had since gotten back to her baseline.    No more chest or shoulder discomfort, +OLIVAS as per above, no orthopnea/PND/edema/syncope/pre-syncope/claudication. No N/V/D/F/C/wt. Changes. EKG today shows NSR at 78 no obvious ischemic changes. She has noticed that arthritis in her shoulders and ankles has gotten worse over the past year.  ______________________________________________________________________  Review of Systems:   As noted in HPI, all others reviewed and are negative  Problem List:  There is no problem list on file for this patient.    Medical History:  No past medical history on file.     Surgical History:  No past surgical history on file.     Social History:  Social History     Social History     Marital status:      Spouse name: N/A     Number of children: N/A     Years of education: N/A     Occupational History     Not on file.     Social History Main Topics     Smoking status: Never Smoker     Smokeless tobacco: Not on file     Alcohol use No     Drug use: Not on file     Sexual activity: Not on file     Other Topics Concern     Not on file     Social History Narrative     Family History:  No family history on file.    Allergies:  Allergies   Allergen Reactions     Bee Pollen Unknown     Haemophilus Influenzae Unknown     Bad reaction     Mite Extract Unknown     Mold Unknown     Other Allergy (See Comments) Unknown     Shellfish Containing Products        Medications:  Current Outpatient Prescriptions   Medication Sig Dispense Refill     albuterol (PROAIR HFA;PROVENTIL HFA;VENTOLIN HFA) 90 mcg/actuation inhaler Inhale 2 puffs every 4 (four) hours as needed for shortness of breath (coughing). 1 Inhaler 0     aspirin 81 MG EC tablet Take 81 mg by mouth daily.       atorvastatin (LIPITOR) 40 MG tablet Take 20 mg by mouth daily.       benzonatate (TESSALON) 100 MG capsule Take 1 capsule (100 mg total) by mouth every 8 (eight) hours. 21  "capsule 0     calcium carbonate-vitamin D3 (CALCIUM 600 + D,3,) 600 mg(1,500mg) -200 unit per tablet Take 1 tablet by mouth daily.       glucosamine-chondroitin 500-400 mg cap Take 1 capsule by mouth daily.       ibuprofen (ADVIL,MOTRIN) 200 MG tablet Take 200 mg by mouth every 6 (six) hours as needed for pain.       lisinopril-hydrochlorothiazide (PRINZIDE,ZESTORETIC) 20-25 mg per tablet Take 1 tablet by mouth daily.       multivit-min-FA-lycopen-lutein (CENTRUM SILVER) 0.4-300-250 mg-mcg-mcg tablet Take 1 tablet by mouth daily.       OMEGA-3/DHA/EPA/FISH OIL (FISH OIL-OMEGA-3 FATTY ACIDS) 300-1,000 mg capsule Take 1 g by mouth daily.       vitamin E 400 unit capsule Take 400 Units by mouth daily.       No current facility-administered medications for this visit.        Objective:   Vital signs:  /58 (Patient Site: Left Arm, Patient Position: Sitting, Cuff Size: Adult Large)  Pulse 87  Resp 16  Ht 5' 3\" (1.6 m)  Wt 213 lb 11.2 oz (96.9 kg)  BMI 37.86 kg/m2  Physical Exam:    GENERAL APPEARANCE: Alert, cooperative and in no acute distress.   HEENT: No scleral icterus. Oral mucuos membranes pink and moist.   NECK: JVP 6. No Hepatojugular reflux. Thyroid not visualized. No lymphadenopathy   CHEST: clear to auscultation   CARDIOVASCULAR: S1, S2 without murmur ,clicks or rubs. Brachial, radial and posterior tibial pulses are intact and symetric. No carotid bruits noted. No edema  ABDOMEN: Nontender. BS+. No bruits.   SKIN: No Xanthelasma   Musculoskeletal: No cyanosis, clubbing or swelling.      Lab Results:  LIPIDS:  No results found for: CHOL  No results found for: HDL  No results found for: LDLCALC  No results found for: TRIG  No components found for: CHOLHDL    BMP:  Lab Results   Component Value Date    CREATININE 0.82 10/21/2017    BUN 16 10/21/2017     (L) 10/21/2017    K 3.5 10/21/2017    CL 95 (L) 10/21/2017    CO2 28 10/21/2017         [facility]                  "

## 2021-07-26 ENCOUNTER — OFFICE VISIT (OUTPATIENT)
Dept: DERMATOLOGY | Facility: CLINIC | Age: 71
End: 2021-07-26
Payer: COMMERCIAL

## 2021-07-26 VITALS — SYSTOLIC BLOOD PRESSURE: 137 MMHG | HEART RATE: 84 BPM | DIASTOLIC BLOOD PRESSURE: 77 MMHG | OXYGEN SATURATION: 99 %

## 2021-07-26 DIAGNOSIS — L81.4 LENTIGINES: ICD-10-CM

## 2021-07-26 DIAGNOSIS — L82.1 SEBORRHEIC KERATOSES: ICD-10-CM

## 2021-07-26 DIAGNOSIS — Z80.8 FAMILY HX OF MELANOMA: ICD-10-CM

## 2021-07-26 DIAGNOSIS — D22.9 MULTIPLE BENIGN NEVI: ICD-10-CM

## 2021-07-26 DIAGNOSIS — D18.01 CHERRY ANGIOMA: Primary | ICD-10-CM

## 2021-07-26 DIAGNOSIS — D48.5 NEOPLASM OF UNCERTAIN BEHAVIOR OF SKIN: ICD-10-CM

## 2021-07-26 PROCEDURE — 11102 TANGNTL BX SKIN SINGLE LES: CPT | Performed by: PHYSICIAN ASSISTANT

## 2021-07-26 PROCEDURE — 99203 OFFICE O/P NEW LOW 30 MIN: CPT | Mod: 25 | Performed by: PHYSICIAN ASSISTANT

## 2021-07-26 NOTE — LETTER
2021         RE: Meggan Jones  [address] [place] [address]        Dear Colleague,    Thank you for referring your patient, Meggan Jones, to the [facility]. Please see a copy of my visit note below.    HPI:  Meggan Jones is a 71 year old female patient here today for growth on right jowl .  Patient states this has been present for 1.5 years.  Patient reports the following symptoms: growing .  Patient reports the following previous treatments: none.  Patient reports the following modifying factors: none.  Associated symptoms: none. Also had a spot on left leg that had been growing. Recently fell off and is not longer bothersome Patient has no other skin complaints today.  Remainder of the HPI, Meds, PMH, Allergies, FH, and SH was reviewed in chart.    Pertinent Hx:   Sister had MM  Past Medical History:   Diagnosis Date     Allergic rhinitis, cause unspecified      ASCUS favor benign ,     neg HPV Plan cotest in 3 yrs     Essential hypertension, benign      Lyme disease        Past Surgical History:   Procedure Laterality Date     BIOPSY OF BREAST, NEEDLE CORE      Negative-left side      C LIGATE FALLOPIAN TUBE,POSTPARTUM      Tubal Ligation     ZZC NONSPECIFIC PROCEDURE      conization of cervix -long ago? time        Family History   Problem Relation Age of Onset     C.A.D. Mother         heart valve replacement- from heart failure at of death at 85     Cancer Father         Prostate, from cardiac arrest at age of 81     Skin Cancer Sister      Cancer Brother         Prostate,heart valve replacemend at ageo f 73-alive      C.A.D. Brother         MI at 71-3 brothers     Family History Negative Brother         one  brother  at age of 69     Family History Negative Sister         6 sisters-one sister with rheumatic fever and related haert murmur, 2 sis iwth htn     Breast Cancer Sister      Breast Cancer Other         niece      Other Cancer Sister         Melanoma     Diabetes No family hx of      Cancer - colorectal No family hx of      Valvular heart disease Mother      Coronary Artery Disease Father        Social History     Socioeconomic History     Marital status:      Spouse name: Not on file     Number of children: Not on file     Years of education: Not on file     Highest education level: Bachelor's degree (e.g., BA, AB, BS)   Occupational History     Not on file   Tobacco Use     Smoking status: Never Smoker     Smokeless tobacco: Never Used   Substance and Sexual Activity     Alcohol use: Yes     Comment: rarely     Drug use: No     Sexual activity: Yes     Partners: Male     Birth control/protection: Surgical     Comment: tubal ligation   Other Topics Concern      Service No     Blood Transfusions No     Caffeine Concern Yes     Comment: 2 cups per day     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 No   Social History Narrative     since 1971 , going well, went back to work in 2013   , 3 children-3 daughters-40 , 37 and 35 yo, 5 granchildren,  no pets     Social Determinants of Health     Financial Resource Strain: Low Risk      Difficulty of Paying Living Expenses: Not very hard   Food Insecurity: No Food Insecurity     Worried About Running Out of Food in the Last Year: Never true     Ran Out of Food in the Last Year: Never true   Transportation Needs: No Transportation Needs     Lack of Transportation (Medical): No     Lack of Transportation (Non-Medical): No   Physical Activity:      Days of Exercise per Week:      Minutes of Exercise per Session:     Stress:      Feeling of Stress :    Social Connections:      Frequency of Communication with Friends and Family:      Frequency of Social Gatherings with Friends and Family:      Attends [employer] Services:      Active Member of Clubs or Organizations:      Attends Club or Organization Meetings:      Marital Status:    Intimate Partner Violence:      Fear of Current or Ex-Partner:      Emotionally Abused:      Physically Abused:      Sexually Abused:        Outpatient Encounter Medications as of 7/26/2021   Medication Sig Dispense Refill     ASPIRIN 81 MG OR TABS 1 tab po QD (Once per day)       atorvastatin (LIPITOR) 20 MG tablet Take 1 tablet by mouth once daily 90 tablet 2     CALCIUM + D 600-200 MG-UNIT PO TABS Reported on 5/10/2017  3     CENTRUM SILVER OR TABS 1 TABLET DAILY       FISH OIL CONCENTRATE OR CAPS 1 capsule daily       IBUPROFEN PO Take 200 mg by mouth as needed for moderate pain       lisinopril-hydrochlorothiazide (ZESTORETIC) 20-25 MG tablet Take 1 tablet by mouth once daily 30 tablet 0     polyethylene glycol 0.4%- propylene glycol 0.3% (SYSTANE ULTRA) 0.4-0.3 % SOLN ophthalmic solution Place 2 drops into both eyes 2 times daily as needed for dry eyes       TURMERIC PO Take by mouth daily       UNABLE TO FIND 5,000 mcg daily MEDICATION NAME: cologen       vitamin E 400 UNIT capsule Take 1 capsule by mouth daily.       lisinopril (ZESTRIL) 20 MG tablet Take 1 tablet (20 mg) by mouth daily (Patient not taking: Reported on 7/26/2021) 90 tablet 3     No facility-administered encounter medications on file as of 7/26/2021.       Review Of Systems:  Skin: spot  Eyes: negative  Ears/Nose/Throat: negative  Respiratory: No shortness of breath, dyspnea on exertion, cough, or hemoptysis  Cardiovascular: negative  Gastrointestinal: negative  Genitourinary: negative  Musculoskeletal: negative  Neurologic: negative  Psychiatric: negative  Hematologic/Lymphatic/Immunologic: negative  Endocrine:  negative      Objective:     /77   Pulse 84   LMP 01/26/2010   SpO2 99%   Eyes: Conjunctivae/lids: Normal   ENT: Lips:  Normal  MSK: Normal  Cardiovascular: Peripheral edema none  Pulm: Breathing Normal  Neuro/Psych: Orientation: A/O x 3. Normal; Mood/Affect: Normal, NAD, WDWN  Pt accompanied by: self  Following areas examined: Scalp, face, eyelids, lips, neck, chest, abdomen, back, R&L upper and lower extremities..  Pt defers exam of buttocks, panus, hips, groin and genitals.   Bal skin type:i   Findings:  Red smooth well-defined macules on trunk and extremities.  Brown, stuck-on scaly appearing papules on trunk and extremities.  Well circumscribed macules with symmetric color distribution on trunk and extremities.  Tan WD smooth macules on face, neck, trunk, and extremities.  Pink and flesh smooth pearly papule on right jowl 0.3cm    Assessment and Plan:     1) Cherry angiomas, Seborrheic keratoses, Benign nevi, Lentigines     I discussed the specifics of tumor, prognosis, and genetics of benign lesions.  I explained that treatment of these lesions would be purely cosmetic and not medically neccessary.  I discussed with patient different removal options including excision, cryotherapy, cautery and /or laser.  Lesion may recur and/or may not completely resolve. May need additional treatment.     2) Neoplasm of uncertain behavior right jowl 0.3cm  [facility] vs [facility]  TANGENTIAL BIOPSY:  After consent, anesthesia with [facility] and prep, tangential biopsy performed.  No complications and routine wound care.  May grow back and will get a scar. Based on lesion type may need to completely remove lesion. Patient will be notified in 7-10 days of results. Wound care directions given.  Base of lesion was dried with pressure and gauze and electrocautery for 2-3 s for 1 cycle. Warned risks of blistering, pain, pigment change, scarring, and incomplete resolution.  Advised patient to return if lesions do not completely  resolve within 2-3 months.  Wound care sheet given.    3) fhx of MM  Signs and Symptoms of non-melanoma skin cancer and ABCDEs of melanoma reviewed with patient. Patient encouraged to perform monthly self skin exams and educated on how to perform them. UV precautions reviewed with patient. Patient was asked about new or changing moles/lesions on body.   Wear a sunscreen with at least SPF 30 on your face, ears, neck and V of the chest daily. Wear sunscreen on other areas of the body if those areas are exposed to the sun throughout the day. Sunscreens can contain physical and/or chemical blockers. Physical blockers are less likely to clog pores, these include zinc oxide and titanium dioxide. Reapply every two hour and after swimming. Sunscreen examples include Neutrogena, CeraVe, Blue Lizard, Elta MD and many others.    Proper skin care from [facility]:    -Eliminate harsh soaps as they strip the natural oils from the skin, often resulting in dry itchy skin ( i.e. Dial, Zest, [place])  -Use mild soaps such as Cetaphil or Dove Sensitive Skin in the shower. You do not need to use soap on arms, legs, and trunk every time you shower unless visibly soiled.   -Avoid hot or cold showers.  -After showering, lightly dry off and apply moisturizing within 2-3 minutes. This will help trap moisture in the skin.   -Aggressive use of a moisturizer at least 1-2 times a day to the entire body (including -Vanicream, Cetaphil, Aquaphor or Cerave) and moisturize hands after every washing.  -We recommend using moisturizers that come in a tub that needs to be scooped out, not a pump. This has more of an oil base. It will hold moisture in your skin much better than a water base moisturizer. The above recommended are non-pore clogging.         It was a pleasure speaking with Meggan Jones today.       Follow up in yearly FBE        Again, thank you for allowing me to participate in the care of your patient.         Sincerely,        Micaela Miller PA-C

## 2021-07-26 NOTE — PROGRESS NOTES
HPI:  Meggan Jones is a 71 year old female patient here today for growth on right jowl .  Patient states this has been present for 1.5 years.  Patient reports the following symptoms: growing .  Patient reports the following previous treatments: none.  Patient reports the following modifying factors: none.  Associated symptoms: none. Also had a spot on left leg that had been growing. Recently fell off and is not longer bothersome Patient has no other skin complaints today.  Remainder of the HPI, Meds, PMH, Allergies, FH, and SH was reviewed in chart.    Pertinent Hx:   Sister had MM  Past Medical History:   Diagnosis Date     Allergic rhinitis, cause unspecified      ASCUS favor benign ,     neg HPV Plan cotest in 3 yrs     Essential hypertension, benign      Lyme disease        Past Surgical History:   Procedure Laterality Date     BIOPSY OF BREAST, NEEDLE CORE      Negative-left side      C LIGATE FALLOPIAN TUBE,POSTPARTUM      Tubal Ligation     ZZC NONSPECIFIC PROCEDURE      conization of cervix -long ago? time        Family History   Problem Relation Age of Onset     C.A.D. Mother         heart valve replacement- from heart failure at of death at 85     Cancer Father         Prostate, from cardiac arrest at age of 81     Skin Cancer Sister      Cancer Brother         Prostate,heart valve replacemend at ageo f 73-alive      C.A.D. Brother         MI at 71-3 brothers     Family History Negative Brother         one  brother  at age of 69     Family History Negative Sister         6 sisters-one sister with rheumatic fever and related haert murmur, 2 sis iwth htn     Breast Cancer Sister      Breast Cancer Other         niece     Other Cancer Sister         Melanoma     Diabetes No family hx of      Cancer - colorectal No family hx of      Valvular heart disease Mother      Coronary Artery Disease Father        Social History     Socioeconomic History     Marital status:       Spouse name: Not on file     Number of children: Not on file     Years of education: Not on file     Highest education level: Bachelor's degree (e.g., BA, AB, BS)   Occupational History     Not on file   Tobacco Use     Smoking status: Never Smoker     Smokeless tobacco: Never Used   Substance and Sexual Activity     Alcohol use: Yes     Comment: rarely     Drug use: No     Sexual activity: Yes     Partners: Male     Birth control/protection: Surgical     Comment: tubal ligation   Other Topics Concern      Service No     Blood Transfusions No     Caffeine Concern Yes     Comment: 2 cups per day     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 No   Social History Narrative     since 1971 , going well, went back to work in 2013   , 3 children-3 daughters-40 , 37 and 35 yo, 5 granchildren,  no pets     Social Determinants of Health     Financial Resource Strain: Low Risk      Difficulty of Paying Living Expenses: Not very hard   Food Insecurity: No Food Insecurity     Worried About Running Out of Food in the Last Year: Never true     Ran Out of Food in the Last Year: Never true   Transportation Needs: No Transportation Needs     Lack of Transportation (Medical): No     Lack of Transportation (Non-Medical): No   Physical Activity:      Days of Exercise per Week:      Minutes of Exercise per Session:    Stress:      Feeling of Stress :    Social Connections:      Frequency of Communication with Friends and Family:      Frequency of Social Gatherings with Friends and Family:      Attends Hoahaoism Services:      Active Member of Clubs or Organizations:       Attends Club or Organization Meetings:      Marital Status:    Intimate Partner Violence:      Fear of Current or Ex-Partner:      Emotionally Abused:      Physically Abused:      Sexually Abused:        Outpatient Encounter Medications as of 7/26/2021   Medication Sig Dispense Refill     ASPIRIN 81 MG OR TABS 1 tab po QD (Once per day)       atorvastatin (LIPITOR) 20 MG tablet Take 1 tablet by mouth once daily 90 tablet 2     CALCIUM + D 600-200 MG-UNIT PO TABS Reported on 5/10/2017  3     CENTRUM SILVER OR TABS 1 TABLET DAILY       FISH OIL CONCENTRATE OR CAPS 1 capsule daily       IBUPROFEN PO Take 200 mg by mouth as needed for moderate pain       lisinopril-hydrochlorothiazide (ZESTORETIC) 20-25 MG tablet Take 1 tablet by mouth once daily 30 tablet 0     polyethylene glycol 0.4%- propylene glycol 0.3% (SYSTANE ULTRA) 0.4-0.3 % SOLN ophthalmic solution Place 2 drops into both eyes 2 times daily as needed for dry eyes       TURMERIC PO Take by mouth daily       UNABLE TO FIND 5,000 mcg daily MEDICATION NAME: cologen       vitamin E 400 UNIT capsule Take 1 capsule by mouth daily.       lisinopril (ZESTRIL) 20 MG tablet Take 1 tablet (20 mg) by mouth daily (Patient not taking: Reported on 7/26/2021) 90 tablet 3     No facility-administered encounter medications on file as of 7/26/2021.       Review Of Systems:  Skin: spot  Eyes: negative  Ears/Nose/Throat: negative  Respiratory: No shortness of breath, dyspnea on exertion, cough, or hemoptysis  Cardiovascular: negative  Gastrointestinal: negative  Genitourinary: negative  Musculoskeletal: negative  Neurologic: negative  Psychiatric: negative  Hematologic/Lymphatic/Immunologic: negative  Endocrine: negative      Objective:     /77   Pulse 84   LMP 01/26/2010   SpO2 99%   Eyes: Conjunctivae/lids: Normal   ENT: Lips:  Normal  MSK: Normal  Cardiovascular: Peripheral edema none  Pulm: Breathing Normal  Neuro/Psych: Orientation: A/O x 3. Normal; Mood/Affect:  Normal, NAD, WDWN  Pt accompanied by: self  Following areas examined: Scalp, face, eyelids, lips, neck, chest, abdomen, back, R&L upper and lower extremities..  Pt defers exam of buttocks, panus, hips, groin and genitals.   Bal skin type:i   Findings:  Red smooth well-defined macules on trunk and extremities.  Brown, stuck-on scaly appearing papules on trunk and extremities.  Well circumscribed macules with symmetric color distribution on trunk and extremities.  Tan WD smooth macules on face, neck, trunk, and extremities.  Pink and flesh smooth pearly papule on right jowl 0.3cm    Assessment and Plan:     1) Cherry angiomas, Seborrheic keratoses, Benign nevi, Lentigines     I discussed the specifics of tumor, prognosis, and genetics of benign lesions.  I explained that treatment of these lesions would be purely cosmetic and not medically neccessary.  I discussed with patient different removal options including excision, cryotherapy, cautery and /or laser.  Lesion may recur and/or may not completely resolve. May need additional treatment.     2) Neoplasm of uncertain behavior right jowl 0.3cm  [facility] vs [facility]  TANGENTIAL BIOPSY:  After consent, anesthesia with [facility] and prep, tangential biopsy performed.  No complications and routine wound care.  May grow back and will get a scar. Based on lesion type may need to completely remove lesion. Patient will be notified in 7-10 days of results. Wound care directions given.  Base of lesion was dried with pressure and gauze and electrocautery for 2-3 s for 1 cycle. Warned risks of blistering, pain, pigment change, scarring, and incomplete resolution.  Advised patient to return if lesions do not completely resolve within 2-3 months.  Wound care sheet given.    3) fhx of MM  Signs and Symptoms of non-melanoma skin cancer and ABCDEs of melanoma reviewed with patient. Patient encouraged to perform monthly self skin exams and educated on how to perform them. UV precautions  reviewed with patient. Patient was asked about new or changing moles/lesions on body.   Wear a sunscreen with at least SPF 30 on your face, ears, neck and V of the chest daily. Wear sunscreen on other areas of the body if those areas are exposed to the sun throughout the day. Sunscreens can contain physical and/or chemical blockers. Physical blockers are less likely to clog pores, these include zinc oxide and titanium dioxide. Reapply every two hour and after swimming. Sunscreen examples include Neutrogena, CeraVe, Blue Lizard, Elta MD and many others.    Proper skin care from [facility]:    -Eliminate harsh soaps as they strip the natural oils from the skin, often resulting in dry itchy skin ( i.e. Dial, Zest, Urdu Spring)  -Use mild soaps such as Cetaphil or Dove Sensitive Skin in the shower. You do not need to use soap on arms, legs, and trunk every time you shower unless visibly soiled.   -Avoid hot or cold showers.  -After showering, lightly dry off and apply moisturizing within 2-3 minutes. This will help trap moisture in the skin.   -Aggressive use of a moisturizer at least 1-2 times a day to the entire body (including -Vanicream, Cetaphil, Aquaphor or Cerave) and moisturize hands after every washing.  -We recommend using moisturizers that come in a tub that needs to be scooped out, not a pump. This has more of an oil base. It will hold moisture in your skin much better than a water base moisturizer. The above recommended are non-pore clogging.         It was a pleasure speaking with Meggan Jones today.       Follow up in yearly FBE

## 2021-07-26 NOTE — PATIENT INSTRUCTIONS
Proper skin care from [facility]:    -Eliminate harsh soaps as they strip the natural oils from the skin, often resulting in dry itchy skin ( i.e. Dial, Zest, Caro Spring)  -Use mild soaps such as Cetaphil or Dove Sensitive Skin in the shower. You do not need to use soap on arms, legs, and trunk every time you shower unless visibly soiled.   -Avoid hot or cold showers.  -After showering, lightly dry off and apply moisturizing within 2-3 minutes. This will help trap moisture in the skin.   -Aggressive use of a moisturizer at least 1-2 times a day to the entire body (including -Vanicream, Cetaphil, Aquaphor or Cerave) and moisturize hands after every washing.  -We recommend using moisturizers that come in a tub that needs to be scooped out, not a pump. This has more of an oil base. It will hold moisture in your skin much better than a water base moisturizer. The above recommended are non-pore clogging.      Wear a sunscreen with at least SPF 30 on your face, ears, neck and V of the chest daily. Wear sunscreen on other areas of the body if those areas are exposed to the sun throughout the day. Sunscreens can contain physical and/or chemical blockers. Physical blockers are less likely to clog pores, these include zinc oxide and titanium dioxide. Reapply every two hour and after swimming. Sunscreen examples include Neutrogena, CeraVe, Blue Lizard, Elta MD and many others.    UV radiation  UVA radiation remains constant throughout the day and throughout the year. It is a longer wavelength than UVB and therefore penetrates deeper into the skin leading to immediate and delayed tanning, photoaging, and skin cancer. 70-80% of UVA and UVB radiation occurs between the hours of 10am-2pm.  UVB radiation  UVB radiation causes the most harmful effects and is more significant during the summer months. However, snow and ice can reflect UVB radiation leading to skin damage during the winter months as well. UVB radiation is  responsible for tanning, burning, inflammation, delayed erythema (pinkness), pigmentation (brown spots), and skin cancer.     I recommend self monthly full body exams and yearly full body exams with a dermatology provider. If you develop a new or changing lesion please follow up for examination. Most skin cancers are pink and scaly or pink and pearly. However, we do see blue/brown/black skin cancers.  Consider the ABCDEs of melanoma when giving yourself your monthly full body exam ( don't forget the groin, buttocks, feet, toes, etc). A-asymmetry, B-borders, C-color, D-diameter, E-elevation or evolving. If you see any of these changes please follow up in clinic. If you cannot see your back I recommend purchasing a hand held mirror to use with a larger wall mirror.                       Wound Care Instructions     FOR SUPERFICIAL WOUNDS     [facility] [phone]    [facility] [phone]          AFTER 24 HOURS YOU SHOULD REMOVE THE BANDAGE AND BEGIN DAILY DRESSING CHANGES AS FOLLOWS:     1) Remove Dressing.     2) Clean and dry the area with tap water using a Q-tip or sterile gauze pad.     3) Apply Vaseline, Aquaphor, Polysporin ointment or Bacitracin ointment over entire wound.  Do NOT use Neosporin ointment.     4) Cover the wound with a band-aid, or a sterile non-stick gauze pad and micropore paper tape      REPEAT THESE INSTRUCTIONS AT LEAST ONCE A DAY UNTIL THE WOUND HAS COMPLETELY HEALED.    It is an old wives tale that a wound heals better when it is exposed to air and allowed to dry out. The wound will heal faster with a better cosmetic result if it is kept moist with ointment and covered with a bandage.    **Do not let the wound dry out.**      Supplies Needed:      *Cotton tipped applicators (Q-tips)    *Polysporin Ointment or Bacitracin Ointment (NOT NEOSPORIN)    *Band-aids or non-stick gauze pads and micropore paper tape.      PATIENT INFORMATION:    During the healing  process you will notice a number of changes. All wounds develop a small halo of redness surrounding the wound.  This means healing is occurring. Severe itching with extensive redness usually indicates sensitivity to the ointment or bandage tape used to dress the wound.  You should call our office if this develops.      Swelling  and/or discoloration around your surgical site is common, particularly when performed around the eye.    All wounds normally drain.  The larger the wound the more drainage there will be.  After 7-10 days, you will notice the wound beginning to shrink and new skin will begin to grow.  The wound is healed when you can see skin has formed over the entire area.  A healed wound has a healthy, shiny look to the surface and is red to dark pink in color to normalize.  Wounds may take approximately 4-6 weeks to heal.  Larger wounds may take 6-8 weeks.  After the wound is healed you may discontinue dressing changes.    You may experience a sensation of tightness as your wound heals. This is normal and will gradually subside.    Your healed wound may be sensitive to temperature changes. This sensitivity improves with time, but if you re having a lot of discomfort, try to avoid temperature extremes.    Patients frequently experience itching after their wound appears to have healed because of the continue healing under the skin.  Plain Vaseline will help relieve the itching.        POSSIBLE COMPLICATIONS    BLEEDIN. Leave the bandage in place.  2. Use tightly rolled up gauze or a cloth to apply direct pressure over the bandage for 30  minutes.  3. Reapply pressure for an additional 30 minutes if necessary  4. Use additional gauze and tape to maintain pressure once the bleeding has stopped.   normal...

## 2021-07-29 LAB
PATH REPORT.COMMENTS IMP SPEC: NORMAL
PATH REPORT.COMMENTS IMP SPEC: NORMAL
PATH REPORT.FINAL DX SPEC: NORMAL
PATH REPORT.GROSS SPEC: NORMAL
PATH REPORT.MICROSCOPIC SPEC OTHER STN: NORMAL
PATH REPORT.RELEVANT HX SPEC: NORMAL

## 2021-07-29 PROCEDURE — 88305 TISSUE EXAM BY PATHOLOGIST: CPT | Performed by: DERMATOLOGY

## 2021-09-05 ENCOUNTER — HEALTH MAINTENANCE LETTER (OUTPATIENT)
Age: 71
End: 2021-09-05

## 2022-01-07 ENCOUNTER — TRANSFERRED RECORDS (OUTPATIENT)
Dept: HEALTH INFORMATION MANAGEMENT | Facility: CLINIC | Age: 72
End: 2022-01-07
Payer: COMMERCIAL

## 2022-01-10 ENCOUNTER — OFFICE VISIT (OUTPATIENT)
Dept: PEDIATRICS | Facility: CLINIC | Age: 72
End: 2022-01-10
Payer: COMMERCIAL

## 2022-01-10 ENCOUNTER — TRANSFERRED RECORDS (OUTPATIENT)
Dept: HEALTH INFORMATION MANAGEMENT | Facility: CLINIC | Age: 72
End: 2022-01-10

## 2022-01-10 VITALS
WEIGHT: 222.1 LBS | RESPIRATION RATE: 18 BRPM | DIASTOLIC BLOOD PRESSURE: 64 MMHG | HEIGHT: 62 IN | OXYGEN SATURATION: 98 % | TEMPERATURE: 98.7 F | SYSTOLIC BLOOD PRESSURE: 132 MMHG | HEART RATE: 88 BPM | BODY MASS INDEX: 40.87 KG/M2

## 2022-01-10 DIAGNOSIS — M25.511 ACUTE PAIN OF RIGHT SHOULDER: Primary | ICD-10-CM

## 2022-01-10 PROCEDURE — 99213 OFFICE O/P EST LOW 20 MIN: CPT | Performed by: INTERNAL MEDICINE

## 2022-01-10 ASSESSMENT — MIFFLIN-ST. JEOR: SCORE: 1475.69

## 2022-01-10 NOTE — PROGRESS NOTES
"  Assessment & Plan       ICD-10-CM    1. Acute pain of right shoulder  M25.511      Keep orthopedic appointment this afternoon.   They can review imaging results and set up a plan for ongoing management.    Return in 10 days (on 1/20/2022) for Routine Visit.    Gee Kim MD  [facility] CASTILLO Broderick is a 71 year old who presents for the following health issues     HPI     ED Followup:    Facility:  [facility]  Date of visit: 01/01/2022  Reason for visit: Chest pain and rapid breathing  Current Status: Pt is taking prednisone and muscle relaxer has been effective, but still experience pain in Rt shoulder radiates around shoulder blade. Pt has a Follow-up appointment with [facility] orthopedic for follow up CT scan     Biggest concern today is right shoulder pain.  Following her ED visit, she was evaluated at [facility] urgent care 1/2/22  At that time, started methylprednisolone and methocarbamol.  These medications significantly improved her symptoms.    CT scan was done on Friday.  Has a f/u ortho visit this afternoon to review the CT result.    Breathing symptoms are improved at this time.          Objective    /64   Pulse 88   Temp 98.7  F (37.1  C) (Oral)   Resp 18   Ht 1.575 m (5' 2\")   Wt 100.7 kg (222 lb 1.6 oz)   LMP 01/26/2010   SpO2 98%   BMI 40.62 kg/m    Body mass index is 40.62 kg/m .  Physical Exam   GEN: No distress  SKIN: No rashes  NECK: Supple  LUNGS: CTA richard. No R, R, W.  CV: RRR. No M.  MS: did not evaluate right shoulder d/t pain and ortho appointment this afternoon          "

## 2022-01-19 ASSESSMENT — ENCOUNTER SYMPTOMS
ARTHRALGIAS: 1
WEAKNESS: 1
NAUSEA: 0
HEMATURIA: 0
JOINT SWELLING: 1
HEADACHES: 0
CHILLS: 0
PALPITATIONS: 0
CONSTIPATION: 0
DIARRHEA: 0
COUGH: 0
SORE THROAT: 0
HEARTBURN: 0
MYALGIAS: 1
NERVOUS/ANXIOUS: 0
FEVER: 0
PARESTHESIAS: 0
HEMATOCHEZIA: 0
BREAST MASS: 0
ABDOMINAL PAIN: 0
DYSURIA: 0
FREQUENCY: 1
DIZZINESS: 0
EYE PAIN: 0
SHORTNESS OF BREATH: 0

## 2022-01-19 ASSESSMENT — ACTIVITIES OF DAILY LIVING (ADL): CURRENT_FUNCTION: NO ASSISTANCE NEEDED

## 2022-01-20 ENCOUNTER — OFFICE VISIT (OUTPATIENT)
Dept: PEDIATRICS | Facility: CLINIC | Age: 72
End: 2022-01-20
Payer: COMMERCIAL

## 2022-01-20 VITALS
HEART RATE: 78 BPM | SYSTOLIC BLOOD PRESSURE: 138 MMHG | TEMPERATURE: 98 F | HEIGHT: 62 IN | RESPIRATION RATE: 14 BRPM | WEIGHT: 223.3 LBS | DIASTOLIC BLOOD PRESSURE: 62 MMHG | BODY MASS INDEX: 41.09 KG/M2

## 2022-01-20 DIAGNOSIS — E78.5 HYPERLIPIDEMIA LDL GOAL <160: ICD-10-CM

## 2022-01-20 DIAGNOSIS — R01.1 HEART MURMUR: ICD-10-CM

## 2022-01-20 DIAGNOSIS — H90.6 MIXED HEARING LOSS, BILATERAL: ICD-10-CM

## 2022-01-20 DIAGNOSIS — E66.01 MORBID OBESITY (H): ICD-10-CM

## 2022-01-20 DIAGNOSIS — Z00.00 ENCOUNTER FOR MEDICARE ANNUAL WELLNESS EXAM: Primary | ICD-10-CM

## 2022-01-20 DIAGNOSIS — I10 ESSENTIAL HYPERTENSION, BENIGN: ICD-10-CM

## 2022-01-20 DIAGNOSIS — Z12.31 VISIT FOR SCREENING MAMMOGRAM: ICD-10-CM

## 2022-01-20 PROBLEM — Z86.0100 HISTORY OF COLONIC POLYPS: Status: ACTIVE | Noted: 2021-04-14

## 2022-01-20 PROCEDURE — 99397 PER PM REEVAL EST PAT 65+ YR: CPT | Performed by: STUDENT IN AN ORGANIZED HEALTH CARE EDUCATION/TRAINING PROGRAM

## 2022-01-20 PROCEDURE — 36415 COLL VENOUS BLD VENIPUNCTURE: CPT | Performed by: STUDENT IN AN ORGANIZED HEALTH CARE EDUCATION/TRAINING PROGRAM

## 2022-01-20 PROCEDURE — 80061 LIPID PANEL: CPT | Performed by: STUDENT IN AN ORGANIZED HEALTH CARE EDUCATION/TRAINING PROGRAM

## 2022-01-20 RX ORDER — LISINOPRIL AND HYDROCHLOROTHIAZIDE 20; 25 MG/1; MG/1
1 TABLET ORAL DAILY
Qty: 90 TABLET | Refills: 3 | Status: SHIPPED | OUTPATIENT
Start: 2022-01-20 | End: 2023-04-14

## 2022-01-20 RX ORDER — ATORVASTATIN CALCIUM 20 MG/1
20 TABLET, FILM COATED ORAL DAILY
Qty: 90 TABLET | Refills: 3 | Status: SHIPPED | OUTPATIENT
Start: 2022-01-20 | End: 2023-01-17

## 2022-01-20 ASSESSMENT — ENCOUNTER SYMPTOMS
DYSURIA: 0
NERVOUS/ANXIOUS: 0
HEMATURIA: 0
ABDOMINAL PAIN: 0
SHORTNESS OF BREATH: 0
DIZZINESS: 0
CHILLS: 0
FREQUENCY: 1
BREAST MASS: 0
FEVER: 0
MYALGIAS: 1
WEAKNESS: 1
HEARTBURN: 0
NAUSEA: 0
JOINT SWELLING: 1
HEMATOCHEZIA: 0
ARTHRALGIAS: 1
SORE THROAT: 0
PARESTHESIAS: 0
CONSTIPATION: 0
HEADACHES: 0
DIARRHEA: 0
PALPITATIONS: 0
COUGH: 0
EYE PAIN: 0

## 2022-01-20 ASSESSMENT — ACTIVITIES OF DAILY LIVING (ADL): CURRENT_FUNCTION: NO ASSISTANCE NEEDED

## 2022-01-20 ASSESSMENT — MIFFLIN-ST. JEOR: SCORE: 1481.13

## 2022-01-20 NOTE — PATIENT INSTRUCTIONS
So nice to meet you!      Get the echocardiogram (heart ultrasound) at [facility] in [place].          Patient Education   Personalized Prevention Plan  You are due for the preventive services outlined below.  Your care team is available to assist you in scheduling these services.  If you have already completed any of these items, please share that information with your care team to update in your medical record.  Health Maintenance Due   Topic Date Due     Zoster (Shingles) Vaccine (2 of 3) 07/10/2013     Flu Vaccine (1) 09/01/2021     Annual Wellness Visit  01/22/2022     Basic Metabolic Panel  01/22/2022     Comprehensive Metabolic Panel  01/22/2022     Cholesterol Lab  01/22/2022     Kidney Microalbumin Urine Test  01/22/2022     ANNUAL REVIEW OF HM ORDERS  01/22/2022     FALL RISK ASSESSMENT  01/22/2022     Mammogram  02/03/2022        Preventive Health Recommendations    See your health care provider every year to    Review health changes.     Discuss preventive care.      Review your medicines if your doctor has prescribed any.      You no longer need a yearly Pap test unless you've had an abnormal Pap test in the past 10 years. If you have vaginal symptoms, such as bleeding or discharge, be sure to talk with your provider about a Pap test.      Every 1 to 2 years, have a mammogram.  If you are over 69, talk with your health care provider about whether or not you want to continue having screening mammograms.      Every 10 years, have a colonoscopy. Or, have a yearly FIT test (stool test). These exams will check for colon cancer.       Have a cholesterol test every 5 years, or more often if your doctor advises it.       Have a diabetes test (fasting glucose) every three years. If you are at risk for diabetes, you should have this test more often.       At age 65, have a bone density scan (DEXA) to check for osteoporosis (brittle bone disease).    Shots:    Get a flu shot each  year.    Get a tetanus shot every 10 years.    Talk to your doctor about your pneumonia vaccines. There are now two you should receive - Pneumovax (PPSV 23) and Prevnar (PCV 13).    Talk to your pharmacist about the shingles vaccine.    Talk to your doctor about the hepatitis B vaccine.    Nutrition:     Eat at least 5 servings of fruits and vegetables each day.      Eat whole-grain bread, whole-wheat pasta and brown rice instead of white grains and rice.      Get adequate about Calcium and Vitamin D.     Lifestyle    Exercise at least 150 minutes a week (30 minutes a day, 5 days a week). This will help you control your weight and prevent disease.      Limit alcohol to one drink per day.      No smoking.       Wear sunscreen to prevent skin cancer.       See your dentist twice a year for an exam and cleaning.      See your eye doctor every 1 to 2 years to screen for conditions such as glaucoma, macular degeneration, cataracts, etc.    Personalized Prevention Plan  You are due for the preventive services outlined below.  Your care team is available to assist you in scheduling these services.  If you have already completed any of these items, please share that information with your care team to update in your medical record.    Health Maintenance Due   Topic Date Due     Zoster (Shingles) Vaccine (2 of 3) 07/10/2013     Flu Vaccine (1) 09/01/2021     Basic Metabolic Panel  01/22/2022     Comprehensive Metabolic Panel  01/22/2022     Cholesterol Lab  01/22/2022     Kidney Microalbumin Urine Test  01/22/2022     ANNUAL REVIEW OF HM ORDERS  01/22/2022     FALL RISK ASSESSMENT  01/22/2022     Mammogram  02/03/2022         SHINGLES VACCINE:  If you are over 50 I recommend the Shingrix vaccine. Two doses of Shingrix provides more than 90% protection against shingles and postherpetic neuralgia (PHN), a type of chronic pain that is the most common complication of shingles.   - even if you've had the older shingles vaccine  (Zostavax) it's okay to get the new vaccine.   - even if you've had shingles before the vaccine can be helpful.    Typically the best (and cheapest!) place to get this vaccine is our pharmacy downstairs. The vaccine is a two shot series - I recommend getting the second shot 2-6 months after the first dose.    You may have a sore arm and feel mild flu-like symptoms for a day or two after the vaccine. Most people do not need to adjust their regular activities. It's okay to take Tylenol or ibuprofen if you have side effects.       Please go to [email or web address] to schedule ALL vaccine appointments and Blood pressure checks.  -Effective December 6th, 2021

## 2022-01-20 NOTE — LETTER
"January 26, 2022      Meggan Jones  [address] [place] [address]        Hi Meggan,       Your cholesterol levels look great. Your total cholesterol was normal. Your LDL (\"bad cholesterol\") was normal. Your HDL (\"good cholesterol\", helps protect your heart) was just slightly below goal.     Resulted Orders   Lipid panel reflex to direct LDL Fasting   Result Value Ref Range    Cholesterol 149 <200 mg/dL    Triglycerides 107 <150 mg/dL    Direct Measure HDL 39 (L) >=50 mg/dL    LDL Cholesterol Calculated 89 <=100 mg/dL    Non HDL Cholesterol 110 <130 mg/dL    Patient Fasting > 8hrs? Yes     Narrative    Cholesterol  Desirable:  <200 mg/dL    Triglycerides  Normal:  Less than 150 mg/dL  Borderline High:  150-199 mg/dL  High:  200-499 mg/dL  Very High:  Greater than or equal to 500 mg/dL    Direct Measure HDL  Female:  Greater than or equal to 50 mg/dL   Male:  Greater than or equal to 40 mg/dL    LDL Cholesterol  Desirable:  <100mg/dL  Above Desirable:  100-129 mg/dL   Borderline High:  130-159 mg/dL   High:  160-189 mg/dL   Very High:  >= 190 mg/dL    Non HDL Cholesterol  Desirable:  130 mg/dL  Above Desirable:  130-159 mg/dL  Borderline High:  160-189 mg/dL  High:  190-219 mg/dL  Very High:  Greater than or equal to 220 mg/dL       Let me know if you have questions or concerns.         Farrah Solis MD   Internal Medicine & Pediatrics   [facility]          "

## 2022-01-20 NOTE — PROGRESS NOTES
"SUBJECTIVE:   Meggan Jones is a 71 year old female who presents for Preventive Visit.      Patient has been advised of split billing requirements and indicates understanding: Yes  Are you in the first 12 months of your Medicare coverage?  No    Healthy Habits:     In general, how would you rate your overall health?  Good    Frequency of exercise:  2-3 days/week    Duration of exercise:  15-30 minutes    Do you usually eat at least 4 servings of fruit and vegetables a day, include whole grains    & fiber and avoid regularly eating high fat or \"junk\" foods?  Yes    Taking medications regularly:  Yes    Medication side effects:  Muscle aches    Ability to successfully perform activities of daily living:  No assistance needed    Home Safety:  Throw rugs in the hallway and lack of grab bars in the bathroom    Hearing Impairment:  Difficulty following a conversation in a noisy restaurant or crowded room, feel that people are mumbling or not speaking clearly, need to ask people to speak up or repeat themselves, difficulty understanding soft or whispered speech and difficulty understanding speech on the telephone    In the past 6 months, have you been bothered by leaking of urine?  No    In general, how would you rate your overall mental or emotional health?  Excellent      PHQ-2 Total Score: 0    Had scare where thought was having heart attack and seen at [facility] ED (emergency department)  -cardiac work up normal    Had right shoulder pain and seen at [facility]  -had X-ray and MRI neck and thought had cervical radiculopathy and pinched nerve  -was on prednisone and muscle relaxant  -starting physical therapy next week       Do you feel safe in your environment? Yes    Have you ever done Advance Care Planning? (For example, a Health Directive, POLST, or a discussion with a medical provider or your loved ones about your wishes): No, advance care planning information given to patient to review.  Patient plans " to discuss their wishes with loved ones or provider.         Fall risk  Fallen 2 or more times in the past year?: No  Any fall with injury in the past year?: No      Cognitive Screening   1) Repeat 3 items (Leader, Season, Table)      2) Clock draw: NORMAL  3) 3 item recall: Recalls 2 objects   Results: NORMAL clock, 1-2 items recalled: COGNITIVE IMPAIRMENT LESS LIKELY    Mini-CogTM Copyright ROSSY Hays. Licensed by the author for use in [facility]; reprinted with permission (julito@[facility]). All rights reserved.          Reviewed and updated as needed this visit by clinical staff  Tobacco   Meds   Med Hx  Surg Hx  Fam Hx  Soc Hx       Reviewed and updated as needed this visit by Provider    Meds            Social History     Tobacco Use     Smoking status: Never Smoker     Smokeless tobacco: Never Used   Substance Use Topics     Alcohol use: Yes     Comment: rarely         Alcohol Use 1/19/2022   Prescreen: >3 drinks/day or >7 drinks/week? No   Prescreen: >3 drinks/day or >7 drinks/week? -           Current providers sharing in care for this patient include:   Patient Care Team:  Stephani Cruz APRN CNP as PCP - General (Family Practice)  Angelica Burns APRN CNP as Assigned PCP  Yeo, Albert, MD as Assigned Musculoskeletal Provider    The following health maintenance items are reviewed in Epic and correct as of today:  Health Maintenance Due   Topic Date Due     ZOSTER IMMUNIZATION (2 of 3) 07/10/2013     BMP  01/22/2022     CMP  01/22/2022     LIPID  01/22/2022     MICROALBUMIN  01/22/2022     ANNUAL REVIEW OF HM ORDERS  01/22/2022     FALL RISK ASSESSMENT  01/22/2022     MAMMO SCREENING  02/03/2022     Review of Systems   Constitutional: Negative for chills and fever.   HENT: Positive for hearing loss. Negative for congestion, ear pain and sore throat.    Eyes: Negative for pain and visual disturbance.   Respiratory: Negative for cough and shortness of breath.    Cardiovascular:  "Positive for chest pain. Negative for palpitations and peripheral edema.   Gastrointestinal: Negative for abdominal pain, constipation, diarrhea, heartburn, hematochezia and nausea.   Breasts:  Negative for tenderness, breast mass and discharge.   Genitourinary: Positive for frequency. Negative for dysuria, genital sores, hematuria, pelvic pain, urgency, vaginal bleeding and vaginal discharge.   Musculoskeletal: Positive for arthralgias, joint swelling and myalgias.   Skin: Positive for rash.   Neurological: Positive for weakness. Negative for dizziness, headaches and paresthesias.   Psychiatric/Behavioral: Negative for mood changes. The patient is not nervous/anxious.      OBJECTIVE:   /62 (BP Location: Right arm, Patient Position: Sitting, Cuff Size: Adult Large)   Pulse 78   Temp 98  F (36.7  C) (Tympanic)   Resp 14   Ht 1.575 m (5' 2\")   Wt 101.3 kg (223 lb 4.8 oz)   LMP 01/26/2010   BMI 40.84 kg/m   Estimated body mass index is 40.84 kg/m  as calculated from the following:    Height as of this encounter: 1.575 m (5' 2\").    Weight as of this encounter: 101.3 kg (223 lb 4.8 oz).  Physical Exam  GENERAL: healthy, alert and no distress  EYES: Eyes grossly normal to inspection, conjunctivae and sclerae normal  HENT: ear canals and TM's normal  NECK: no adenopathy, no asymmetry, masses, or scars   RESP: lungs clear to auscultation - no rales, rhonchi or wheezes  CV: regular rate and rhythm, normal S1 S2, no S3 or S4, 1/6 murmur best heart in upper right sternal border, click or rub, no peripheral edema and peripheral pulses strong  ABDOMEN: soft, nontender, no hepatosplenomegaly, no masses   MS: no gross musculoskeletal defects noted, no edema  SKIN: no suspicious lesions or rashes  NEURO: Normal strength and tone, mentation intact and speech normal  PSYCH: mentation appears normal, affect normal/bright      ASSESSMENT / PLAN:       ICD-10-CM    1. Encounter for Medicare annual wellness exam  Z00.00  " "  2. Visit for screening mammogram  Z12.31 MA SCREENING DIGITAL BILAT - Future  (s+30)   3. Essential hypertension, benign  I10 lisinopril-hydrochlorothiazide (ZESTORETIC) 20-25 MG tablet   4. Hyperlipidemia LDL goal <160  E78.5 atorvastatin (LIPITOR) 20 MG tablet   5. Heart murmur  R01.1 Echocardiogram Complete   6. Morbid obesity (H)  E66.01    7. Mixed hearing loss, bilateral  H90.6 Adult Audiology Referral     2. Discussed screening recommend. Patient has family members who had breast cancer at advanced age. Annual mammogram.  3. blood pressure well controlled today. Refill medications. BMP normal at [facility] earlier this month.  4. Check lipids today.  5. Discussed likely benign etiology. Also discussed option of obtaining echocardiogram, which patient prefers to do.  6. Patient is trying to increase exercise and wants to lose weight.  7. Patient reports reduced hearing in both ears over past several years, prgoressing. Interested in hearing aids.       COUNSELING:  Reviewed preventive health counseling, as reflected in patient instructions    Estimated body mass index is 40.84 kg/m  as calculated from the following:    Height as of this encounter: 1.575 m (5' 2\").    Weight as of this encounter: 101.3 kg (223 lb 4.8 oz).      She reports that she has never smoked. She has never used smokeless tobacco.      Appropriate preventive services were discussed with this patient, including applicable screening as appropriate for cardiovascular disease, diabetes, osteopenia/osteoporosis, and glaucoma.  As appropriate for age/gender, discussed screening for colorectal cancer, prostate cancer, breast cancer, and cervical cancer. Checklist reviewing preventive services available has been given to the patient.    Reviewed patients plan of care and provided an AVS. The Basic Care Plan (routine screening as documented in Health Maintenance) for Meggan meets the Care Plan requirement. This Care Plan has been established and " reviewed with the Patient.    Counseling Resources:  ATP IV Guidelines  Pooled Cohorts Equation Calculator  Breast Cancer Risk Calculator  Breast Cancer: Medication to Reduce Risk  FRAX Risk Assessment  ICSI Preventive Guidelines  Dietary Guidelines for Americans, 2010  USDA's MyPlate  ASA Prophylaxis  Lung CA Screening    Farrah Solis MD  [facility]    Identified Health Risks:

## 2022-01-21 LAB
CHOLEST SERPL-MCNC: 149 MG/DL
FASTING STATUS PATIENT QL REPORTED: YES
HDLC SERPL-MCNC: 39 MG/DL
LDLC SERPL CALC-MCNC: 89 MG/DL
NONHDLC SERPL-MCNC: 110 MG/DL
TRIGL SERPL-MCNC: 107 MG/DL

## 2022-01-24 ENCOUNTER — THERAPY VISIT (OUTPATIENT)
Dept: PHYSICAL THERAPY | Facility: CLINIC | Age: 72
End: 2022-01-24
Payer: COMMERCIAL

## 2022-01-24 DIAGNOSIS — M54.2 NECK PAIN: Primary | ICD-10-CM

## 2022-01-24 PROCEDURE — 97110 THERAPEUTIC EXERCISES: CPT | Mod: GP | Performed by: PHYSICAL THERAPIST

## 2022-01-24 PROCEDURE — 97161 PT EVAL LOW COMPLEX 20 MIN: CPT | Mod: GP | Performed by: PHYSICAL THERAPIST

## 2022-01-24 NOTE — PROGRESS NOTES
Physical Therapy Initial Evaluation  Subjective:  The history is provided by the patient. No  was used.   Patient Health History  Meggan Jones being seen for Neck pain referred by Dr. Pope. .     Problem began: 1/1/2022.   Problem occurred: unknown    Pain is reported as 2/10 on pain scale.  General health as reported by patient is good.  Pertinent medical history includes: high blood pressure, overweight, concussions/dizziness, menopausal and osteoarthritis.   Red flags:  Chest pain and pain at rest/night (patient reports radiating from neck. ).  Medical allergies: latex. Other medical allergies details: Shellfish.   Surgeries include:  Other. Other surgery history details: tubal ligation .    Current medications:  Anti-inflammatory and high blood pressure medication. Other medications details: Prednisone dosed pack (1/2/22-1/8/22), prescribed muscle relaxants but not currently taking them. .    Current occupation is Retired, however writing .   Primary job tasks include:  Computer work, prolonged sitting and driving.                  Therapist Generated HPI Evaluation  Problem details: New onset neck pain January 1st, 2022 with radiating chest and R sided arm pain. She went to the ER concerned she was having a heart attack which they concluded was not the case.  She went to [facility] who did imaging on the neck and decided it was a pinched nerve.  X-rays and MRI images were taken there. She was put on a prednisone dosed pack and muscle relaxers which really seemed to help. She is not taking either now. Patient does not remember any specific thing that caused her symptoms, however over the Holiday's she did a lot of lifting and holding of a baby, slept with grandkids next to her, fell asleep on the couch with the neck in a funny position.  She feels that whole combination really set off the neck pain.    Patient reports narrowing present in the neck through the MRI.     Waking a couple of times a  night.  Missing up to 2 hours per night of sleep. .         Type of problem:  Cervical spine.    This is a new condition.  Condition occurred with:  Other reason.  Where condition occurred: other and for unknown reasons.  Patient reports pain:  Lower cervical spine and cervical right side.  Pain is described as sharp and aching (stiff) and is intermittent.  Pain radiates to:  Shoulder right. Pain is the same all the time.  Since onset symptoms are gradually improving.  Associated symptoms:  Loss of strength and tingling (loos of coordination on the right and  strength. (curling iron and spray bottles) ). Symptoms are exacerbated by change of position, rotating head, driving and lying down  and relieved by muscle relaxants, heat and rest.  Special tests included:  MRI and x-ray (requesting reports from [facility]. ).    Work activity restrictions: retired   Barriers include:  None as reported by patient.                        Objective:  System              Cervical/Thoracic Evaluation    AROM:  AROM Cervical:    Flexion:            47  Extension:       52  Rotation:         Left: 60     Right: 62  Side Bend:      Left: 35     Right:  33        Cervical Myotomes:        C4 (shrug):  Left: 5    Right: 5  C5 (Deltoid):  Left: 5    Right: 5  C6 (Biceps):  Left: 5-    Right: 5-  C7 (Triceps):  Left: 4+    Right: 5-  C8 (Thumb Ext): Left: 5-    Right: 5-  T1 (Intrinsics): Left: 5-    Right: 5-  DTR's:  normal          Cervical Dermatomes:  not assessed (denies numbness)                    Cervical Palpation:    Tenderness present at Left:    Scalenes; Rhomboids; Upper Trap and Levator  Tenderness present at Right:    Scalenes; Rhomboids; Upper Trap and Levator    Cervical Stability/Joint Clearing:  Stability/joint clearing spine: Spurlings: (-) bilaterally, distraction: not difinitively positive.       Negative:ALAR Ligament and TLA AP  Spinal Segmental Conclusions:  Increased pain with CPA of Lower cervical spine and CT  junction.   Level:  Hypo at C2, C5, C6, C7 and T1      Cord Sign:  not assessed                                                General Evaluation:                        Posture:    Standing:   Rounded shoulders, forward head and thoracic kyphosis increased  Sitting:  Rounded shoulders, forward head and thoracic kyphosis increased                                           ROS    Assessment/Plan:    Patient is a 71 year old female with cervical complaints.    Patient has the following significant findings with corresponding treatment plan.                Diagnosis 1:  Neck Pain  Pain -  hot/cold therapy, electric stimulation, mechanical traction, manual therapy, splint/taping/bracing/orthotics, self management, education, directional preference exercise and home program  Decreased ROM/flexibility - manual therapy, therapeutic exercise and home program  Decreased joint mobility - manual therapy, therapeutic exercise and home program  Decreased strength - therapeutic exercise, therapeutic activities and home program  Impaired muscle performance - neuro re-education and home program  Decreased function - therapeutic activities and home program  Impaired posture - neuro re-education and home program    Therapy Evaluation Codes:   1) Clinical presentation characteristics are:   Stable/Uncomplicated.  2) Decision-Making    Low complexity using standardized patient assessment instrument and/or measureable assessment of functional outcome.  Cumulative Therapy Evaluation is: Low complexity.    Previous and current functional limitations:  (See Goal Flow Sheet for this information)    Short term and Long term goals: (See Goal Flow Sheet for this information)     Communication ability:  Patient appears to be able to clearly communicate and understand verbal and written communication and follow directions correctly.  Treatment Explanation - The following has been discussed with the patient:   RX ordered/plan of care  Anticipated  outcomes  Possible risks and side effects  This patient would benefit from PT intervention to resume normal activities.   Rehab potential is good.    Frequency:  1 X week, once daily  Duration:  for 8 weeks  Discharge Plan:  Achieve all LTG.  Independent in home treatment program.  Reach maximal therapeutic benefit.    Please refer to the daily flowsheet for treatment today, total treatment time and time spent performing 1:1 timed codes.

## 2022-01-26 NOTE — PROGRESS NOTES
[facility]    OUTPATIENT Physical Therapy ORTHOPEDIC EVALUATION  PLAN OF TREATMENT FOR OUTPATIENT REHABILITATION  (COMPLETE FOR INITIAL CLAIMS ONLY)  Patient's Last Name, First Name, M.I.  YOB: 1950  Meggan Jones    Provider s Name:  [facility]   Medical Record No.  [id number]   Start of Care Date:  01/24/22   Onset Date:   01/01/22   Type:     _X__PT   ___OT Medical Diagnosis:    Encounter Diagnosis   Name Primary?     Neck pain Yes        Treatment Diagnosis:  Neck pain        Goals:     01/24/22 0500   Body Part   Goals listed below are for Neck pain   Goal #1   Goal #1 sleeping   Previous Functional Level No restrictions   Current Functional Level 1-2 hours without sleep per night   Performance Level waking and difficulty falling asleep due to pain    STG Target Performance Less than 1 hour without sleep per night   Rationale to establish restorative sleep pattern   Due Date 02/22/22   LTG Target Performance Sleep through the night w/o meds   Rationale to establish restorative sleep pattern   Due Date 03/22/22       Therapy Frequency:  1   Predicted Duration of Therapy Intervention:  8 wks     Nikki Aguirre, PT                 I CERTIFY THE NEED FOR THESE SERVICES FURNISHED UNDER        THIS PLAN OF TREATMENT AND WHILE UNDER MY CARE .             Physician Signature               Date    X_____________________________________________________                             Certification Date From:  01/24/22   Certification Date To:  03/22/22    Referring Provider:  Song Pope    Initial Assessment        See Epic Evaluation SOC Date: 01/24/22

## 2022-02-03 ENCOUNTER — THERAPY VISIT (OUTPATIENT)
Dept: PHYSICAL THERAPY | Facility: CLINIC | Age: 72
End: 2022-02-03
Payer: COMMERCIAL

## 2022-02-03 DIAGNOSIS — M54.2 NECK PAIN: ICD-10-CM

## 2022-02-03 PROCEDURE — 97110 THERAPEUTIC EXERCISES: CPT | Mod: GP | Performed by: PHYSICAL THERAPIST

## 2022-02-03 PROCEDURE — 97140 MANUAL THERAPY 1/> REGIONS: CPT | Mod: GP | Performed by: PHYSICAL THERAPIST

## 2022-02-09 ENCOUNTER — THERAPY VISIT (OUTPATIENT)
Dept: PHYSICAL THERAPY | Facility: CLINIC | Age: 72
End: 2022-02-09
Payer: COMMERCIAL

## 2022-02-09 DIAGNOSIS — M54.2 NECK PAIN: ICD-10-CM

## 2022-02-09 PROCEDURE — 97140 MANUAL THERAPY 1/> REGIONS: CPT | Mod: GP | Performed by: PHYSICAL THERAPIST

## 2022-02-09 PROCEDURE — 97110 THERAPEUTIC EXERCISES: CPT | Mod: GP | Performed by: PHYSICAL THERAPIST

## 2022-02-23 ENCOUNTER — THERAPY VISIT (OUTPATIENT)
Dept: PHYSICAL THERAPY | Facility: CLINIC | Age: 72
End: 2022-02-23
Payer: COMMERCIAL

## 2022-02-23 DIAGNOSIS — M54.2 NECK PAIN: ICD-10-CM

## 2022-02-23 PROCEDURE — 97110 THERAPEUTIC EXERCISES: CPT | Mod: GP | Performed by: PHYSICAL THERAPIST

## 2022-02-23 PROCEDURE — 97140 MANUAL THERAPY 1/> REGIONS: CPT | Mod: GP | Performed by: PHYSICAL THERAPIST

## 2022-03-09 ENCOUNTER — HOSPITAL ENCOUNTER (OUTPATIENT)
Dept: CARDIOLOGY | Facility: CLINIC | Age: 72
Discharge: HOME | End: 2022-03-09
Attending: STUDENT IN AN ORGANIZED HEALTH CARE EDUCATION/TRAINING PROGRAM | Admitting: STUDENT IN AN ORGANIZED HEALTH CARE EDUCATION/TRAINING PROGRAM
Payer: COMMERCIAL

## 2022-03-09 DIAGNOSIS — R01.1 HEART MURMUR: ICD-10-CM

## 2022-03-09 LAB — LVEF ECHO: NORMAL

## 2022-03-09 PROCEDURE — 93306 TTE W/DOPPLER COMPLETE: CPT | Mod: 26 | Performed by: INTERNAL MEDICINE

## 2022-03-09 PROCEDURE — 93306 TTE W/DOPPLER COMPLETE: CPT

## 2022-04-12 PROBLEM — M54.2 NECK PAIN: Status: RESOLVED | Noted: 2022-01-24 | Resolved: 2022-04-12

## 2022-04-17 ENCOUNTER — HEALTH MAINTENANCE LETTER (OUTPATIENT)
Age: 72
End: 2022-04-17

## 2022-07-06 ENCOUNTER — LAB (OUTPATIENT)
Dept: FAMILY MEDICINE | Facility: CLINIC | Age: 72
End: 2022-07-06
Attending: FAMILY MEDICINE
Payer: COMMERCIAL

## 2022-07-06 DIAGNOSIS — Z20.822 SUSPECTED 2019 NOVEL CORONAVIRUS INFECTION: ICD-10-CM

## 2022-07-06 LAB — SARS-COV-2 RNA RESP QL NAA+PROBE: POSITIVE

## 2022-07-06 PROCEDURE — U0005 INFEC AGEN DETEC AMPLI PROBE: HCPCS

## 2022-07-06 PROCEDURE — U0003 INFECTIOUS AGENT DETECTION BY NUCLEIC ACID (DNA OR RNA); SEVERE ACUTE RESPIRATORY SYNDROME CORONAVIRUS 2 (SARS-COV-2) (CORONAVIRUS DISEASE [COVID-19]), AMPLIFIED PROBE TECHNIQUE, MAKING USE OF HIGH THROUGHPUT TECHNOLOGIES AS DESCRIBED BY CMS-2020-01-R: HCPCS

## 2022-07-08 ENCOUNTER — VIRTUAL VISIT (OUTPATIENT)
Dept: INTERNAL MEDICINE | Facility: CLINIC | Age: 72
End: 2022-07-08
Payer: COMMERCIAL

## 2022-07-08 DIAGNOSIS — U07.1 INFECTION DUE TO 2019 NOVEL CORONAVIRUS: Primary | ICD-10-CM

## 2022-07-08 PROCEDURE — 99214 OFFICE O/P EST MOD 30 MIN: CPT | Mod: 95 | Performed by: NURSE PRACTITIONER

## 2022-07-08 NOTE — PROGRESS NOTES
"Meggan is a 71 year old who is being evaluated via a billable video visit.      How would you like to obtain your AVS? MyChart  If the video visit is dropped, the invitation should be resent by: Send to e-mail at: [email or web address]  Will anyone else be joining your video visit? No- will be there if needed          Assessment & Plan     Infection due to 2019 novel coronavirus  - Symptomatic, + Home COVID test. Meets criteria for treatment  - Paxlovid prescribed, counseled on use/side effects  - Hold atorvastatin for 1 week while on Paxlovid therapy  - Return precautions reviewed  - Reviewed symptomatic home cares  - nirmatrelvir and ritonavir (PAXLOVID) therapy pack  Dispense: 30 each; Refill: 0         BMI:   Estimated body mass index is 40.84 kg/m  as calculated from the following:    Height as of 1/20/22: 1.575 m (5' 2\").    Weight as of 1/20/22: 101.3 kg (223 lb 4.8 oz).       COVID-19 positive patient.  Encounter for consideration of medication intervention. Patient does qualify for a prescription. Full discussion with patient including medication options, risks and benefits. Potential drug interactions reviewed with patient.     Treatment Planned Paxlovid, Rx sent to [place] pharmacy  [facility] [phone]    [address] [place] [address] , [address]  [place] [address]    Hours:  Mon-Fri: 8:00am - 6:00p  Sat: 9:00a - 12:30p     Drive Thru available  Temporary change to home medications: Hold atorvastatin for 1 week    Estimated body mass index is 40.84 kg/m  as calculated from the following:    Height as of 1/20/22: 1.575 m (5' 2\").    Weight as of 1/20/22: 101.3 kg (223 lb 4.8 oz).  GFR Estimate   Date Value Ref Range Status   01/22/2021 70 >60 mL/min/[1.73_m2] Final     Comment:     Non  GFR Calc  Starting 12/18/2018, serum creatinine based estimated GFR (eGFR) will be   calculated using the Chronic Kidney Disease Epidemiology Collaboration   (CKD-EPI) equation.       Lab Results "   Component Value Date    [id number] Positive (A) 07/06/2022       Return for If symptoms worsen/fail to improve.    MITUL Giang CNP  [facility]    Jo-Ann Broderick is a 71 year old presenting for the following health issues:  Suspected Covid      HPI       COVID-19 Symptom Review  How many days ago did these symptoms start? July 4th    Are any of the following symptoms significant for you?    New or worsening difficulty breathing? No    Worsening cough? Yes, it's a dry cough.     Fever or chills? Yes, I felt feverish or had chills.    Headache: YES    Sore throat: YES    Chest pain: YES-sore ribs from coughing    Diarrhea: YES    Body aches? YES  Fatigue? yes  New rash? no  Loss of taste or smell? Yes  Runny nose? yes  Upset stomach and loss of appetite  Eyes are itchy, dry    What treatments has patient tried? Acetaminophen, Antihistamines  and IBU, gargaling with salt water   Does patient live in a nursing home, group home, or shelter? No  Does patient have a way to get food/medications during quarantined? Yes, I have a friend or family member who can help me.    Patient is seen via virtual visit today for a positive home COVID test.  States her symptoms started on July 4 where she started feeling symptoms of stomach flu with progression of symptoms to her head feeling plugged, fever, body aches.  She had a home COVID test on July 6 and resulted positive yesterday.  She reports that her son-in-law and grandson have also tested positive.  She does report significant exhaustion though this feels a little bit better today.  She does have a cough but no shortness of breath.  Overall she feels very weak.  She is eating and drinking okay but does endorse an upset stomach and some diarrhea.  She is drinking fluid replacement with electrolytes (Propel water).  She does report feeling slightly better than 4 days ago.  She still has a little bit of a queasy stomach.  The diarrhea  is settling down to 2-3 small loose stools per day.      Review of Systems   CONSTITUTIONAL:NEGATIVE for fever, chills, change in weight; POSITIVE for fatigue  EYES: NEGATIVE for vision changes or irritation  ENT/MOUTH: POSITIVE for nasal congestion and rhinorrhea-clear  RESP:POSITIVE for cough-non productive and NEGATIVE for SOB/dyspnea  CV: NEGATIVE for chest pain, palpitations or peripheral edema  GI: POSITIVE for diarrhea and nausea and NEGATIVE for abdominal pain generalized  MUSCULOSKELETAL: NEGATIVE for significant arthralgias or myalgia  NEURO: NEGATIVE for weakness, dizziness or paresthesias      Objective           Vitals:  No vitals were obtained today due to virtual visit.    Physical Exam   GENERAL: Healthy, alert and no distress  EYES: Eyes grossly normal to inspection.  No discharge or erythema, or obvious scleral/conjunctival abnormalities.  RESP: No audible wheeze, cough, or visible cyanosis.  No visible retractions or increased work of breathing.    SKIN: Visible skin clear. No significant rash, abnormal pigmentation or lesions.  NEURO: Cranial nerves grossly intact.  Mentation and speech appropriate for age.  PSYCH: Mentation appears normal, affect normal/bright, judgement and insight intact, normal speech and appearance well-groomed.        Video-Visit Details    Video Start Time: 2:47    Type of service:  Video Visit    Video End Time:3:01    Originating Location (pt. Location): Home    Distant Location (provider location):  [facility]     Platform used for Video Visit: Kaseya    Nicolas Cortez

## 2022-07-08 NOTE — PATIENT INSTRUCTIONS
"Discharge Instructions for COVID-19 Patients  You have--or may have--COVID-19. Please follow the instructions listed below.   If you have a weakened immune system, discuss with your doctor any other actions you need to take.  How can I protect others?  If you have symptoms (fever, cough, body aches or trouble breathing):  Stay home and away from others (self-isolate) until:  Your other symptoms have resolved (gotten better). And   You've had no fever--and no medicine that reduces fever--for 1 full day (24 hours). And   At least 10 days have passed since your symptoms started. (You may need to wait 20 days. Follow the advice of your care team.)  If you don't show symptoms, but testing showed that you have COVID-19:  Stay home and away from others (self-isolate) until at least 10 days have passed since the date of your first positive COVID-19 test.  During this time  Stay in your own room, even for meals. Use your own bathroom if you can.  Stay away from others in your home. No hugging, kissing or shaking hands. No visitors.  Don't go to work, school or anywhere else.  Clean \"high touch\" surfaces often (doorknobs, counters, handles). Use household cleaning spray or wipes.  You'll find a full list of  on the EPA website: www.epa.gov/pesticide-registration/list-n-disinfectants-use-against-sars-cov-2.  Cover your mouth and nose with a mask or other face covering to avoid spreading germs.  Wash your hands and face often. Use soap and water.  Caregivers in these groups are at risk for severe illness due to COVID-19:  People 65 years and older  People who live in a nursing home or long-term care facility  People with chronic disease (lung, heart, cancer, diabetes, kidney, liver, immunologic)  People who have a weakened immune system, including those who:  Are in cancer treatment  Take medicine that weakens the immune system, such as corticosteroids  Had a bone marrow or organ transplant  Have an immune " deficiency  Have poorly controlled HIV or AIDS  Are obese (body mass index of 40 or higher)  Smoke regularly  Caregivers should wear gloves while washing dishes, handling laundry and cleaning bedrooms and bathrooms.  Use caution when washing and drying laundry: Don't shake dirty laundry and use the warmest water setting that you can.  For more tips on managing your health at home, go to www.cdc.gov/coronavirus/2019-ncov/downloads/10Things.pdf.  How can I take care of myself at home?  Get lots of rest. Drink extra fluids (unless a doctor has told you not to).  Take Tylenol (acetaminophen) for fever or pain. If you have liver or kidney problems, ask your family doctor if it's okay to take Tylenol.   Adults can take either:   650 mg (two 325 mg pills) every 4 to 6 hours, or   1,000 mg (two 500 mg pills) every 8 hours as needed.  Note: Don't take more than 3,000 mg in one day. Acetaminophen is found in many medicines (both prescribed and over-the-counter medicines). Read all labels to be sure you don't take too much.   For children, check the Tylenol bottle for the right dose. The dose is based on the child's age or weight.  If you have other health problems (like cancer, heart failure, an organ transplant or severe kidney disease): Call your specialty clinic if you don't feel better in the next 2 days.  Know when to call 911. Emergency warning signs include:  Trouble breathing or shortness of breath  Pain or pressure in the chest that doesn't go away  Feeling confused like you haven't felt before, or not being able to wake up  Bluish-colored lips or face  Your doctor may have prescribed a blood thinner medicine. Follow their instructions.  Where can I get more information?   [facility] - About COVID-19:   [email or web address]  CDC - What to Do If You're Sick: www.cdc.gov/coronavirus/2019-ncov/about/steps-when-sick.html  CDC - Ending Home Isolation:  www.cdc.gov/coronavirus/2019-ncov/hcp/disposition-in-home-patients.html  CDC - Caring for Someone: www.cdc.gov/coronavirus/2019-ncov/if-you-are-sick/care-for-someone.html  [facility] - Interim Guidance for Hospital Discharge to Home: www.health.[facility].[place]/diseases/coronavirus/hcp/hospdischarge.pdf  Below are the COVID-19 hotlines at the [place] Department of Health ([facility]). Interpreters are available.  For health questions: Call 664-957-0094 or 1-959.488.6088 (7 a.m. to 7 p.m.)  For questions about schools and childcare: Call 893-871-1960 or 1-886.896.1426 (7 a.m. to 7 p.m.)    For informational purposes only. Not to replace the advice of your health care provider. Clinically reviewed by Dr. Anthony Pena.   Copyright   2020 [facility]. All rights reserved. [employer] 817694 - REV 01/05/21.

## 2022-10-23 ENCOUNTER — HEALTH MAINTENANCE LETTER (OUTPATIENT)
Age: 72
End: 2022-10-23

## 2023-01-14 DIAGNOSIS — E78.5 HYPERLIPIDEMIA LDL GOAL <160: ICD-10-CM

## 2023-01-17 RX ORDER — ATORVASTATIN CALCIUM 20 MG/1
20 TABLET, FILM COATED ORAL DAILY
Qty: 90 TABLET | Refills: 0 | Status: SHIPPED | OUTPATIENT
Start: 2023-01-17 | End: 2023-01-26

## 2023-01-23 ENCOUNTER — E-VISIT (OUTPATIENT)
Dept: PEDIATRICS | Facility: CLINIC | Age: 73
End: 2023-01-23
Payer: COMMERCIAL

## 2023-01-23 DIAGNOSIS — J01.90 ACUTE SINUSITIS WITH SYMPTOMS > 10 DAYS: Primary | ICD-10-CM

## 2023-01-23 PROCEDURE — 99421 OL DIG E/M SVC 5-10 MIN: CPT | Performed by: STUDENT IN AN ORGANIZED HEALTH CARE EDUCATION/TRAINING PROGRAM

## 2023-01-23 NOTE — PATIENT INSTRUCTIONS
Sinusitis (Antibiotic Treatment)    The sinuses are air-filled spaces within the bones of the face. They connect to the inside of the nose. Sinusitis is an inflammation of the tissue that lines the sinuses. Sinusitis can occur during a cold. It can also happen due to allergies to pollens and other particles in the air. Sinusitis can cause symptoms of sinus congestion and a feeling of fullness. A sinus infection causes fever, headache, and facial pain. There is often green or yellow fluid draining from the nose or into the back of the throat (post-nasal drip). You have been given antibiotics to treat this condition.   Home care    Take the full course of antibiotics as instructed. Don't stop taking them, even when you feel better.    Drink plenty of water, hot tea, and other liquids as directed by the healthcare provider. This may help thin nasal mucus. It also may help your sinuses drain fluids.    Heat may help soothe painful areas of your face. Use a towel soaked in hot water. Or,  the shower and direct the warm spray onto your face. Using a vaporizer along with a menthol rub at night may also help soothe symptoms.     An expectorant with guaifenesin may help thin nasal mucus and help your sinuses drain fluids. Talk with your provider or pharmacists before taking an over-the-counter (OTC) medicine if you have any questions about it or its side effects..    You can use an OTC decongestant, unless a similar medicine was prescribed to you. Nasal sprays work the fastest. Use one that contains phenylephrine or oxymetazoline. First blow your nose gently. Then use the spray. Don't use these medicines more often than directed on the label. If you do, your symptoms may get worse. You may also take pills that contain pseudoephedrine. Don t use products that combine multiple medicines. This is because side effects may be increased. Read labels. You can also ask the pharmacist for help. (People with high blood  pressure should not use decongestants. They can raise blood pressure.) Talk with your provider or pharmacist if you have any questions about the medicine..    OTC antihistamines may help if allergies contributed to your sinusitis. Talk with your provider or pharmacist if you have any questions about the medicine..    Don't use nasal rinses or irrigation during an acute sinus infection, unless your healthcare provider tells you to. Rinsing may spread the infection to other areas in your sinuses.    Use acetaminophen or ibuprofen to control pain, unless another pain medicine was prescribed to you. If you have chronic liver or kidney disease or ever had a stomach ulcer, talk with your healthcare provider before using these medicines. Never give aspirin to anyone under age 18 who is ill with a fever. It may cause severe liver damage.    Don't smoke. This can make symptoms worse.    Follow-up care  Follow up with your healthcare provider, or as advised.   When to seek medical advice  Call your healthcare provider if any of these occur:     Facial pain or headache that gets worse    Stiff neck    Unusual drowsiness or confusion    Swelling of your forehead or eyelids    Symptoms don't go away in 10 days    Vision problems, such as blurred or double vision    Fever of 100.4 F (38 C) or higher, or as directed by your healthcare provider  Call 911  Call 911 if any of these occur:     Seizure    Trouble breathing    Feeling dizzy or faint    Fingernails, skin or lips look blue, purple , or gray  Prevention  Here are steps you can take to help prevent an infection:     Keep good hand washing habits.    Don t have close contact with people who have sore throats, colds, or other upper respiratory infections.    Don t smoke, and stay away from secondhand smoke.    Stay up to date with of your vaccines.  [employer] last reviewed this educational content on 12/1/2019 2000-2021 [employer]. All rights reserved. This  information is not intended as a substitute for professional medical care. Always follow your healthcare professional's instructions.        Dear Meggan Jones    After reviewing your responses, I've been able to diagnose you with a sinus infection.      Based on your responses and diagnosis, I have prescribed antibiotics called Augmentin (amoxicillin clavulonic acid) to treat your symptoms. I have sent this to your pharmacy.?     It is also important to stay well hydrated, get lots of rest and take over-the-counter decongestants,?tylenol?or ibuprofen if you?are able to?take those medications per your primary care provider to help relieve discomfort.?     It is important that you take?all of?your prescribed medication even if your symptoms are improving after a few doses.? Taking?all of?your medicine helps prevent the symptoms from returning.?     If your symptoms worsen, you develop severe headache, vomiting, high fever (>102), or are not improving in 7 days, please contact your primary care provider for an appointment or visit any of our convenient Walk-in Care or Urgent Care Centers to be seen which can be found on our website?here.?     Thanks again for choosing?us?as your health care partner,?   ?  Farrah Solis MD?

## 2023-01-25 SDOH — ECONOMIC STABILITY: FOOD INSECURITY: WITHIN THE PAST 12 MONTHS, THE FOOD YOU BOUGHT JUST DIDN'T LAST AND YOU DIDN'T HAVE MONEY TO GET MORE.: NEVER TRUE

## 2023-01-25 SDOH — ECONOMIC STABILITY: FOOD INSECURITY: WITHIN THE PAST 12 MONTHS, YOU WORRIED THAT YOUR FOOD WOULD RUN OUT BEFORE YOU GOT MONEY TO BUY MORE.: NEVER TRUE

## 2023-01-25 SDOH — ECONOMIC STABILITY: INCOME INSECURITY: IN THE LAST 12 MONTHS, WAS THERE A TIME WHEN YOU WERE NOT ABLE TO PAY THE MORTGAGE OR RENT ON TIME?: NO

## 2023-01-25 SDOH — HEALTH STABILITY: PHYSICAL HEALTH: ON AVERAGE, HOW MANY MINUTES DO YOU ENGAGE IN EXERCISE AT THIS LEVEL?: 30 MIN

## 2023-01-25 SDOH — ECONOMIC STABILITY: INCOME INSECURITY: HOW HARD IS IT FOR YOU TO PAY FOR THE VERY BASICS LIKE FOOD, HOUSING, MEDICAL CARE, AND HEATING?: NOT VERY HARD

## 2023-01-25 SDOH — HEALTH STABILITY: PHYSICAL HEALTH: ON AVERAGE, HOW MANY DAYS PER WEEK DO YOU ENGAGE IN MODERATE TO STRENUOUS EXERCISE (LIKE A BRISK WALK)?: 3 DAYS

## 2023-01-25 ASSESSMENT — ENCOUNTER SYMPTOMS
NAUSEA: 0
SHORTNESS OF BREATH: 0
MYALGIAS: 1
COUGH: 1
BREAST MASS: 0
EYE PAIN: 0
NERVOUS/ANXIOUS: 0
CHILLS: 0
ARTHRALGIAS: 1
ABDOMINAL PAIN: 0
DIZZINESS: 0
HEMATOCHEZIA: 0
PALPITATIONS: 0
CONSTIPATION: 0
JOINT SWELLING: 1
DIARRHEA: 0
DYSURIA: 0
PARESTHESIAS: 0
FEVER: 0
WEAKNESS: 0
HEMATURIA: 0
HEADACHES: 0
HEARTBURN: 0
SORE THROAT: 0
FREQUENCY: 1

## 2023-01-25 ASSESSMENT — LIFESTYLE VARIABLES
SKIP TO QUESTIONS 9-10: 1
AUDIT-C TOTAL SCORE: 1
HOW OFTEN DO YOU HAVE SIX OR MORE DRINKS ON ONE OCCASION: NEVER
HOW OFTEN DO YOU HAVE A DRINK CONTAINING ALCOHOL: MONTHLY OR LESS
HOW MANY STANDARD DRINKS CONTAINING ALCOHOL DO YOU HAVE ON A TYPICAL DAY: 1 OR 2

## 2023-01-25 ASSESSMENT — SOCIAL DETERMINANTS OF HEALTH (SDOH)
HOW OFTEN DO YOU GET TOGETHER WITH FRIENDS OR RELATIVES?: ONCE A WEEK
HOW OFTEN DO YOU ATTEND CHURCH OR RELIGIOUS SERVICES?: MORE THAN 4 TIMES PER YEAR
DO YOU BELONG TO ANY CLUBS OR ORGANIZATIONS SUCH AS CHURCH GROUPS UNIONS, FRATERNAL OR ATHLETIC GROUPS, OR SCHOOL GROUPS?: NO
IN A TYPICAL WEEK, HOW MANY TIMES DO YOU TALK ON THE PHONE WITH FAMILY, FRIENDS, OR NEIGHBORS?: MORE THAN THREE TIMES A WEEK

## 2023-01-25 ASSESSMENT — ACTIVITIES OF DAILY LIVING (ADL): CURRENT_FUNCTION: NO ASSISTANCE NEEDED

## 2023-01-26 ENCOUNTER — OFFICE VISIT (OUTPATIENT)
Dept: PEDIATRICS | Facility: CLINIC | Age: 73
End: 2023-01-26
Payer: COMMERCIAL

## 2023-01-26 VITALS
BODY MASS INDEX: 40.3 KG/M2 | HEIGHT: 62 IN | SYSTOLIC BLOOD PRESSURE: 146 MMHG | DIASTOLIC BLOOD PRESSURE: 44 MMHG | WEIGHT: 219 LBS | TEMPERATURE: 97.5 F | RESPIRATION RATE: 14 BRPM | HEART RATE: 75 BPM | OXYGEN SATURATION: 99 %

## 2023-01-26 DIAGNOSIS — Z13.220 SCREENING FOR HYPERLIPIDEMIA: ICD-10-CM

## 2023-01-26 DIAGNOSIS — I10 ESSENTIAL HYPERTENSION, BENIGN: ICD-10-CM

## 2023-01-26 DIAGNOSIS — E66.01 MORBID OBESITY (H): ICD-10-CM

## 2023-01-26 DIAGNOSIS — Z12.31 VISIT FOR SCREENING MAMMOGRAM: ICD-10-CM

## 2023-01-26 DIAGNOSIS — E78.5 HYPERLIPIDEMIA LDL GOAL <160: ICD-10-CM

## 2023-01-26 DIAGNOSIS — Z00.00 ENCOUNTER FOR MEDICARE ANNUAL WELLNESS EXAM: Primary | ICD-10-CM

## 2023-01-26 LAB
ALBUMIN SERPL BCG-MCNC: 4.2 G/DL (ref 3.5–5.2)
ALP SERPL-CCNC: 100 U/L (ref 35–104)
ALT SERPL W P-5'-P-CCNC: 25 U/L (ref 10–35)
ANION GAP SERPL CALCULATED.3IONS-SCNC: 12 MMOL/L (ref 7–15)
AST SERPL W P-5'-P-CCNC: 31 U/L (ref 10–35)
BILIRUB SERPL-MCNC: 0.4 MG/DL
BUN SERPL-MCNC: 18.8 MG/DL (ref 8–23)
CALCIUM SERPL-MCNC: 9.9 MG/DL (ref 8.8–10.2)
CHLORIDE SERPL-SCNC: 100 MMOL/L (ref 98–107)
CHOLEST SERPL-MCNC: 134 MG/DL
CREAT SERPL-MCNC: 0.82 MG/DL (ref 0.51–0.95)
CREAT UR-MCNC: 40.5 MG/DL
DEPRECATED HCO3 PLAS-SCNC: 25 MMOL/L (ref 22–29)
GFR SERPL CREATININE-BSD FRML MDRD: 76 ML/MIN/1.73M2
GLUCOSE SERPL-MCNC: 100 MG/DL (ref 70–99)
HDLC SERPL-MCNC: 35 MG/DL
LDLC SERPL CALC-MCNC: 70 MG/DL
MICROALBUMIN UR-MCNC: <12 MG/L
MICROALBUMIN/CREAT UR: NORMAL MG/G{CREAT}
NONHDLC SERPL-MCNC: 99 MG/DL
POTASSIUM SERPL-SCNC: 4.3 MMOL/L (ref 3.4–5.3)
PROT SERPL-MCNC: 7.4 G/DL (ref 6.4–8.3)
SODIUM SERPL-SCNC: 137 MMOL/L (ref 136–145)
TRIGL SERPL-MCNC: 146 MG/DL

## 2023-01-26 PROCEDURE — 82570 ASSAY OF URINE CREATININE: CPT | Performed by: STUDENT IN AN ORGANIZED HEALTH CARE EDUCATION/TRAINING PROGRAM

## 2023-01-26 PROCEDURE — 82043 UR ALBUMIN QUANTITATIVE: CPT | Performed by: STUDENT IN AN ORGANIZED HEALTH CARE EDUCATION/TRAINING PROGRAM

## 2023-01-26 PROCEDURE — 80053 COMPREHEN METABOLIC PANEL: CPT | Performed by: STUDENT IN AN ORGANIZED HEALTH CARE EDUCATION/TRAINING PROGRAM

## 2023-01-26 PROCEDURE — 99397 PER PM REEVAL EST PAT 65+ YR: CPT | Performed by: STUDENT IN AN ORGANIZED HEALTH CARE EDUCATION/TRAINING PROGRAM

## 2023-01-26 PROCEDURE — 80061 LIPID PANEL: CPT | Performed by: STUDENT IN AN ORGANIZED HEALTH CARE EDUCATION/TRAINING PROGRAM

## 2023-01-26 PROCEDURE — 36415 COLL VENOUS BLD VENIPUNCTURE: CPT | Performed by: STUDENT IN AN ORGANIZED HEALTH CARE EDUCATION/TRAINING PROGRAM

## 2023-01-26 PROCEDURE — 99213 OFFICE O/P EST LOW 20 MIN: CPT | Mod: 25 | Performed by: STUDENT IN AN ORGANIZED HEALTH CARE EDUCATION/TRAINING PROGRAM

## 2023-01-26 RX ORDER — ATORVASTATIN CALCIUM 20 MG/1
20 TABLET, FILM COATED ORAL DAILY
Qty: 90 TABLET | Refills: 3 | Status: SHIPPED | OUTPATIENT
Start: 2023-01-26

## 2023-01-26 ASSESSMENT — ENCOUNTER SYMPTOMS
SHORTNESS OF BREATH: 0
ABDOMINAL PAIN: 0
JOINT SWELLING: 1
HEARTBURN: 0
HEADACHES: 0
EYE PAIN: 0
CHILLS: 0
DYSURIA: 0
CONSTIPATION: 0
PALPITATIONS: 0
WEAKNESS: 0
NERVOUS/ANXIOUS: 0
PARESTHESIAS: 0
FEVER: 0
DIARRHEA: 0
HEMATURIA: 0
BREAST MASS: 0
COUGH: 1
SORE THROAT: 0
FREQUENCY: 1
NAUSEA: 0
ARTHRALGIAS: 1
HEMATOCHEZIA: 0
DIZZINESS: 0
MYALGIAS: 1

## 2023-01-26 ASSESSMENT — ACTIVITIES OF DAILY LIVING (ADL): CURRENT_FUNCTION: NO ASSISTANCE NEEDED

## 2023-01-26 NOTE — LETTER
"February 2, 2023      Meggan Jones  [address] [place] [address]        Dear Meggan,     Here are your recent lab results:     There is a normal amount of protein in your urine.       Your metabolic panel (kidney function, electrolytes, liver enzymes) was normal.       Your cholesterol levels look great. Your total cholesterol was normal. Your LDL (\"bad cholesterol\") was normal. Your HDL (\"good cholesterol\", helps protect your heart) was just a little low.       Focusing on continuing a good exercise routine with 150 minutes of moderate exercise weekly and a diet including 5 servings of fruits and vegetables daily will help protect against the risk of heart attack or stroke in the future.       Good luck with the move!     Resulted Orders   Lipid panel reflex to direct LDL Non-fasting   Result Value Ref Range    Cholesterol 134 <200 mg/dL    Triglycerides 146 <150 mg/dL    Direct Measure HDL 35 (L) >=50 mg/dL    LDL Cholesterol Calculated 70 <=100 mg/dL    Non HDL Cholesterol 99 <130 mg/dL    Narrative    Cholesterol  Desirable:  <200 mg/dL    Triglycerides  Normal:  Less than 150 mg/dL  Borderline High:  150-199 mg/dL  High:  200-499 mg/dL  Very High:  Greater than or equal to 500 mg/dL    Direct Measure HDL  Female:  Greater than or equal to 50 mg/dL   Male:  Greater than or equal to 40 mg/dL    LDL Cholesterol  Desirable:  <100mg/dL  Above Desirable:  100-129 mg/dL   Borderline High:  130-159 mg/dL   High:  160-189 mg/dL   Very High:  >= 190 mg/dL    Non HDL Cholesterol  Desirable:  130 mg/dL  Above Desirable:  130-159 mg/dL  Borderline High:  160-189 mg/dL  High:  190-219 mg/dL  Very High:  Greater than or equal to 220 mg/dL       Please let us know if you have questions or concerns.           Farrah Pizarro MD   Internal Medicine & Pediatrics   [facility]           "

## 2023-01-26 NOTE — PROGRESS NOTES
"SUBJECTIVE:   Meggan is a 72 year old who presents for Preventive Visit.    Patient has been advised of split billing requirements and indicates understanding: Yes  Are you in the first 12 months of your Medicare coverage?  No    Healthy Habits:     In general, how would you rate your overall health?  Good    Frequency of exercise:  2-3 days/week    Duration of exercise:  15-30 minutes    Do you usually eat at least 4 servings of fruit and vegetables a day, include whole grains    & fiber and avoid regularly eating high fat or \"junk\" foods?  Yes    Taking medications regularly:  Yes    Medication side effects:  Muscle aches    Ability to successfully perform activities of daily living:  No assistance needed    Home Safety:  No safety concerns identified    Hearing Impairment:  Difficulty following a conversation in a noisy restaurant or crowded room, feel that people are mumbling or not speaking clearly, difficult to understand a speaker at a public meeting or Taoist service, need to ask people to speak up or repeat themselves, difficulty understanding soft or whispered speech and difficulty understanding speech on the telephone    In the past 6 months, have you been bothered by leaking of urine?  No    In general, how would you rate your overall mental or emotional health?  Excellent      PHQ-2 Total Score: 0    Moving to [place] - to be near daughter  Will go to clinic in [place]    Arthritis in hands getting worse        Have you ever done Advance Care Planning? (For example, a Health Directive, POLST, or a discussion with a medical provider or your loved ones about your wishes): No, advance care planning information given to patient to review.  Patient declined advance care planning discussion at this time.      Fall risk  Fallen 2 or more times in the past year?: No  Any fall with injury in the past year?: No    Cognitive Screening    1) Repeat 3 items (Leader, Season, Table)    2) Clock draw: NORMAL  3) 3 " item recall: Recalls 3 objects  Results: 3 items recalled: COGNITIVE IMPAIRMENT LESS LIKELY    Mini-CogTM Copyright S Saúl. Licensed by the author for use in [facility]; reprinted with permission ([email or web address]). All rights reserved.      Do you have sleep apnea, excessive snoring or daytime drowsiness?: no    Reviewed and updated as needed this visit by clinical staff    Allergies  Nico Cavazos on 1/26/2023 at 10:28 AM    Reviewed and updated as needed this visit by Provider                 Social History     Tobacco Use     Smoking status: Never     Smokeless tobacco: Never   Substance Use Topics     Alcohol use: Yes     Comment: Rarely, occasional glass of wine       Alcohol Use 1/25/2023   Prescreen: >3 drinks/day or >7 drinks/week? No   Prescreen: >3 drinks/day or >7 drinks/week? -       Current providers sharing in care for this patient include:   Patient Care Team:  Farrah Solis MD as PCP - General (Internal Medicine - Pediatrics)  Farrah Solis MD as Assigned PCP    The following health maintenance items are reviewed in Epic and correct as of today:  Health Maintenance   Topic Date Due     ZOSTER IMMUNIZATION (2 of 3) 07/10/2013     BMP  01/22/2022     CMP  01/22/2022     MICROALBUMIN  01/22/2022     COVID-19 Vaccine (4 - Booster for Moderna series) 02/01/2022     MAMMO SCREENING  02/03/2022     INFLUENZA VACCINE (1) 09/01/2022     LIPID  01/20/2023     MEDICARE ANNUAL WELLNESS VISIT  01/20/2023     ANNUAL REVIEW OF HM ORDERS  07/08/2023     FALL RISK ASSESSMENT  01/26/2024     DEXA  02/03/2026     COLORECTAL CANCER SCREENING  04/14/2026     ADVANCE CARE PLANNING  01/20/2027     DTAP/TDAP/TD IMMUNIZATION (3 - Td or Tdap) 12/12/2029     HEPATITIS C SCREENING  Completed     PHQ-2 (once per calendar year)  Completed     Pneumococcal Vaccine: 65+ Years  Completed     IPV IMMUNIZATION  Aged Out     MENINGITIS IMMUNIZATION  Aged Out       Review of Systems  "  Constitutional: Negative for chills and fever.   HENT: Positive for congestion and hearing loss. Negative for ear pain and sore throat.    Eyes: Negative for pain and visual disturbance.   Respiratory: Positive for cough. Negative for shortness of breath.    Cardiovascular: Negative for chest pain, palpitations and peripheral edema.   Gastrointestinal: Negative for abdominal pain, constipation, diarrhea, heartburn, hematochezia and nausea.   Breasts:  Negative for tenderness, breast mass and discharge.   Genitourinary: Positive for frequency. Negative for dysuria, genital sores, hematuria, pelvic pain, urgency, vaginal bleeding and vaginal discharge.   Musculoskeletal: Positive for arthralgias, joint swelling and myalgias.   Skin: Negative for rash.   Neurological: Negative for dizziness, weakness, headaches and paresthesias.   Psychiatric/Behavioral: Negative for mood changes. The patient is not nervous/anxious.      OBJECTIVE:   BP (!) 146/44 (BP Location: Right arm, Patient Position: Sitting, Cuff Size: Adult Large)   Pulse 75   Temp 97.5  F (36.4  C) (Temporal)   Resp 14   Ht 1.562 m (5' 1.5\")   Wt 99.3 kg (219 lb)   LMP 01/26/2010   SpO2 99%   BMI 40.71 kg/m   Estimated body mass index is 40.71 kg/m  as calculated from the following:    Height as of this encounter: 1.562 m (5' 1.5\").    Weight as of this encounter: 99.3 kg (219 lb).  Physical Exam  GENERAL: healthy, alert and no distress  EYES: Eyes grossly normal to inspection, and conjunctivae and sclerae normal  HENT: ear canals and TM's normal  NECK: no adenopathy, no asymmetry, masses, or scars and thyroid normal to palpation  RESP: lungs clear to auscultation - no rales, rhonchi or wheezes  CV: regular rate and rhythm, normal S1 S2, no S3 or S4, no murmur, click or rub, no peripheral edema  MS: no gross musculoskeletal defects noted, no edema  SKIN: brown stuck on papule on mid back  NEURO: Normal strength and tone, mentation intact and speech " normal  PSYCH: mentation appears normal, affect normal/bright    ASSESSMENT / PLAN:       ICD-10-CM    1. Encounter for Medicare annual wellness exam  Z00.00       2. Essential hypertension, benign  I10 COMPREHENSIVE METABOLIC PANEL     Albumin Random Urine Quantitative with Creat Ratio      3. Visit for screening mammogram  Z12.31 MA SCREENING DIGITAL BILAT - Future  (s+30)      4. Screening for hyperlipidemia  Z13.220 Lipid panel reflex to direct LDL Non-fasting      5. Hyperlipidemia LDL goal <160  E78.5 Lipid panel reflex to direct LDL Non-fasting     atorvastatin (LIPITOR) 20 MG tablet      6. Morbid obesity (H)  E66.01         2. Blood pressure not at goal. Recommend patient check home blood pressure and if persistent >140 systolic or 90 diastolic to make appointment with me or in [place] to discuss medication changes.  6. Complicated by hypertension and hyperlipidemia. Reviewed the treatment options for obesity including diet and exercise regimens.  Emphasized that lifestyle change is a critical component of all treatment plans. Patient is busy with moving at this time.      COUNSELING:  Reviewed preventive health counseling, as reflected in patient instructions    She reports that she has never smoked. She has never used smokeless tobacco.    Appropriate preventive services were discussed with this patient, including applicable screening as appropriate for cardiovascular disease, diabetes, osteopenia/osteoporosis, and glaucoma.  As appropriate for age/gender, discussed screening for colorectal cancer, prostate cancer, breast cancer, and cervical cancer. Checklist reviewing preventive services available has been given to the patient.    Reviewed patients plan of care and provided an AVS. The Basic Care Plan (routine screening as documented in Health Maintenance) for Meggan meets the Care Plan requirement. This Care Plan has been established and reviewed with the Patient.          Farrah Solis MD  [facility]  [facility]    Identified Health Risks:

## 2023-01-26 NOTE — PATIENT INSTRUCTIONS
So nice to see you!    Voltaren (diclofenac) gel - orange tube  -ibuprofen (Advil or Motrin) in gel form    Consider buying the [employer] home automatic blood pressure cuff. Available on [employer], at pharmacies.  I recommend checking your blood pressure 2-3 times per week at around the same time every day.  Keep a log in a notebook or in a note in your cell phone. If it's persistently higher than 140 on top or 90 on bottom let me know and we should consider increasing your blood pressure medication, or make an appointment in [place].      SHINGLES VACCINE:  If you are over 50 I recommend the Shingrix vaccine. Two doses of Shingrix provides more than 90% protection against shingles and postherpetic neuralgia (PHN), a type of chronic pain that is the most common complication of shingles.   - even if you've had the older shingles vaccine (Zostavax) it's okay to get the new vaccine.   - even if you've had shingles before the vaccine can be helpful.    Typically the best (and cheapest!) place to get this vaccine is our pharmacy downstairs. The vaccine is a two shot series - I recommend getting the second shot 2-6 months after the first dose.    You may have a sore arm and feel mild flu-like symptoms for a day or two after the vaccine. Most people do not need to adjust their regular activities. It's okay to take Tylenol or ibuprofen if you have side effects.         Patient Education   Personalized Prevention Plan  You are due for the preventive services outlined below.  Your care team is available to assist you in scheduling these services.  If you have already completed any of these items, please share that information with your care team to update in your medical record.  Health Maintenance Due   Topic Date Due    Zoster (Shingles) Vaccine (2 of 3) 07/10/2013    Comprehensive Metabolic Panel  01/22/2022    Kidney Microalbumin Urine Test  01/22/2022    COVID-19 Vaccine (4 - Booster for Moderna series) 02/01/2022     Mammogram  02/03/2022    Flu Vaccine (1) 09/01/2022    Cholesterol Lab  01/20/2023       Signs of Hearing Loss      Hearing much better with one ear can be a sign of hearing loss.   Hearing loss is a problem shared by many people. In fact, it is one of the most common health problems, particularly as people age. Most people age 65 and older have some hearing loss. By age 80, almost everyone does. Hearing loss often occurs slowly over the years. So you may not realize your hearing has gotten worse.  Have your hearing checked  Call your healthcare provider if you:  Have to strain to hear normal conversation  Have to watch other people s faces very carefully to follow what they re saying  Need to ask people to repeat what they ve said  Often misunderstand what people are saying  Turn the volume of the television or radio up so high that others complain  Feel that people are mumbling when they re talking to you  Find that the effort to hear leaves you feeling tired and irritated  Notice, when using the phone, that you hear better with one ear than the other

## 2023-02-08 ENCOUNTER — ANCILLARY PROCEDURE (OUTPATIENT)
Dept: MAMMOGRAPHY | Facility: CLINIC | Age: 73
End: 2023-02-08
Payer: COMMERCIAL

## 2023-02-08 DIAGNOSIS — Z12.31 VISIT FOR SCREENING MAMMOGRAM: ICD-10-CM

## 2023-02-08 PROCEDURE — 77067 SCR MAMMO BI INCL CAD: CPT | Mod: TC | Performed by: RADIOLOGY

## 2023-04-13 DIAGNOSIS — I10 ESSENTIAL HYPERTENSION, BENIGN: ICD-10-CM

## 2023-04-14 RX ORDER — LISINOPRIL AND HYDROCHLOROTHIAZIDE 20; 25 MG/1; MG/1
1 TABLET ORAL DAILY
Qty: 90 TABLET | Refills: 3 | Status: SHIPPED | OUTPATIENT
Start: 2023-04-14

## 2023-04-14 NOTE — TELEPHONE ENCOUNTER
Refill signed. If blood pressure still elevated >140/90 at home should have visit with me or she should make appointment in [place] (patient moving there).    Farrah Solis MD  Internal Medicine & Pediatrics  [facility]  She/her

## 2023-04-14 NOTE — TELEPHONE ENCOUNTER
Routing refill request to provider for review/approval because:  Labs out of range:  Blood pressure elevated

## 2023-04-17 NOTE — TELEPHONE ENCOUNTER
Called patient. Her BP's at home have been <140/90, 120-30's/ 60-70s. She also reports she's lost a couple lbs too.    Pt has moved to [place], in in process of finding new PCP as she also had to get new insurance.

## 2023-12-05 ENCOUNTER — PATIENT OUTREACH (OUTPATIENT)
Dept: GASTROENTEROLOGY | Facility: CLINIC | Age: 73
End: 2023-12-05
Payer: COMMERCIAL

## 2024-01-10 NOTE — PROGRESS NOTES
Subjective:  HPI  Physical Exam                    Objective:  System    Physical Exam    General     ROS    Assessment/Plan:    PROGRESS  REPORT    Progress reporting period is from 1-16-20 to 3-5-20.       SUBJECTIVE    Subjective: Pt states most of the time right shoulder pain is about 1-2/10 but she overworked it again at her cabin and pain is now 3-4/10. Pt still having increased pain with reaching above shoulder level forward or out to the side. She is generally feeling stronger and able to reach to shoulder height.      Current Pain level: 2/10.     Previous pain level was  3/10  .   Changes in function:  Yes (See Goal flowsheet attached for changes in current functional level)  Adverse reaction to treatment or activity: activity - cleaning at her cabin    OBJECTIVE  Changes noted in objective findings:  Yes, improved ROM and strength  Objective: MMT right shoulder flex 5-/5, ext 5/5, abd 4+/5, IR 5/5, ER 4-/5. Jonny with resisted flex, abd, IR and ER, right shoulder AROM flex 157, abd 160 with painful arc of abd, Decreased guarding right UT     ASSESSMENT/PLAN  Updated problem list and treatment plan: Diagnosis 1:  B shoulder pain  Pain -  hot/cold therapy and manual therapy  Decreased ROM/flexibility - manual therapy, therapeutic exercise and home program  Decreased strength - therapeutic exercise and therapeutic activities  STG/LTGs have been met or progress has been made towards goals:  Yes (See Goal flow sheet completed today.)  Assessment of Progress: The patient's condition is improving.  The patient's condition has potential to improve.  Self Management Plans:  Patient has been instructed in a home treatment program.  I have re-evaluated this patient and find that the nature, scope, duration and intensity of the therapy is appropriate for the medical condition of the patient.  Meggan continues to require the following intervention to meet STG and LTG's:  PT and Pt will be out of state for several weeks  and will schedule follow up upon return for recheck and advancement of exercises as indicated    Recommendations:  Pt will return to therapy upon return  and continue their home treatment program    Please refer to the daily flowsheet for treatment today, total treatment time and time spent performing 1:1 timed codes.           Patient on 4units lantus at home   -ISS

## 2024-03-30 ENCOUNTER — HEALTH MAINTENANCE LETTER (OUTPATIENT)
Age: 74
End: 2024-03-30

## 2024-06-08 ENCOUNTER — HEALTH MAINTENANCE LETTER (OUTPATIENT)
Age: 74
End: 2024-06-08

## 2025-04-13 ENCOUNTER — HEALTH MAINTENANCE LETTER (OUTPATIENT)
Age: 75
End: 2025-04-13

## 2025-06-15 ENCOUNTER — HEALTH MAINTENANCE LETTER (OUTPATIENT)
Age: 75
End: 2025-06-15